# Patient Record
Sex: MALE | Race: WHITE | NOT HISPANIC OR LATINO | Employment: OTHER | URBAN - METROPOLITAN AREA
[De-identification: names, ages, dates, MRNs, and addresses within clinical notes are randomized per-mention and may not be internally consistent; named-entity substitution may affect disease eponyms.]

---

## 2018-06-24 ENCOUNTER — HOSPITAL ENCOUNTER (INPATIENT)
Facility: MEDICAL CENTER | Age: 83
LOS: 3 days | DRG: 728 | End: 2018-06-27
Attending: EMERGENCY MEDICINE | Admitting: HOSPITALIST
Payer: MEDICARE

## 2018-06-24 ENCOUNTER — APPOINTMENT (OUTPATIENT)
Dept: RADIOLOGY | Facility: MEDICAL CENTER | Age: 83
DRG: 728 | End: 2018-06-24
Attending: EMERGENCY MEDICINE
Payer: MEDICARE

## 2018-06-24 DIAGNOSIS — E11.42 DIABETIC POLYNEUROPATHY ASSOCIATED WITH TYPE 2 DIABETES MELLITUS (HCC): ICD-10-CM

## 2018-06-24 DIAGNOSIS — M54.50 ACUTE LOW BACK PAIN WITHOUT SCIATICA, UNSPECIFIED BACK PAIN LATERALITY: ICD-10-CM

## 2018-06-24 DIAGNOSIS — M53.3 BACK PAIN, SACROILIAC: ICD-10-CM

## 2018-06-24 DIAGNOSIS — E86.0 DEHYDRATION: ICD-10-CM

## 2018-06-24 DIAGNOSIS — L03.317 CELLULITIS OF BUTTOCK: ICD-10-CM

## 2018-06-24 DIAGNOSIS — E87.20 LACTIC ACIDOSIS: ICD-10-CM

## 2018-06-24 PROBLEM — N48.22 CELLULITIS, PENIS: Status: ACTIVE | Noted: 2018-06-24

## 2018-06-24 PROBLEM — Z79.4 TYPE 2 DIABETES MELLITUS WITH NEUROLOGIC COMPLICATION, WITH LONG-TERM CURRENT USE OF INSULIN (HCC): Status: ACTIVE | Noted: 2018-06-24

## 2018-06-24 PROBLEM — E11.49 TYPE 2 DIABETES MELLITUS WITH NEUROLOGIC COMPLICATION, WITH LONG-TERM CURRENT USE OF INSULIN (HCC): Status: ACTIVE | Noted: 2018-06-24

## 2018-06-24 PROBLEM — Z86.718 HISTORY OF DVT (DEEP VEIN THROMBOSIS): Status: ACTIVE | Noted: 2018-06-24

## 2018-06-24 PROBLEM — Z72.0 TOBACCO ABUSE: Status: ACTIVE | Noted: 2018-06-24

## 2018-06-24 LAB
ALBUMIN SERPL BCP-MCNC: 4 G/DL (ref 3.2–4.9)
ALBUMIN/GLOB SERPL: 1.1 G/DL
ALP SERPL-CCNC: 96 U/L (ref 30–99)
ALT SERPL-CCNC: 11 U/L (ref 2–50)
ANION GAP SERPL CALC-SCNC: 14 MMOL/L (ref 0–11.9)
AST SERPL-CCNC: 11 U/L (ref 12–45)
BASOPHILS # BLD AUTO: 0.4 % (ref 0–1.8)
BASOPHILS # BLD: 0.06 K/UL (ref 0–0.12)
BILIRUB SERPL-MCNC: 0.8 MG/DL (ref 0.1–1.5)
BNP SERPL-MCNC: 62 PG/ML (ref 0–100)
BUN SERPL-MCNC: 28 MG/DL (ref 8–22)
CALCIUM SERPL-MCNC: 9.7 MG/DL (ref 8.5–10.5)
CHLORIDE SERPL-SCNC: 94 MMOL/L (ref 96–112)
CK SERPL-CCNC: 63 U/L (ref 0–154)
CO2 SERPL-SCNC: 24 MMOL/L (ref 20–33)
CREAT SERPL-MCNC: 1.14 MG/DL (ref 0.5–1.4)
EOSINOPHIL # BLD AUTO: 0 K/UL (ref 0–0.51)
EOSINOPHIL NFR BLD: 0 % (ref 0–6.9)
ERYTHROCYTE [DISTWIDTH] IN BLOOD BY AUTOMATED COUNT: 42.7 FL (ref 35.9–50)
EST. AVERAGE GLUCOSE BLD GHB EST-MCNC: 355 MG/DL
GLOBULIN SER CALC-MCNC: 3.5 G/DL (ref 1.9–3.5)
GLUCOSE BLD-MCNC: 368 MG/DL (ref 65–99)
GLUCOSE BLD-MCNC: 489 MG/DL (ref 65–99)
GLUCOSE BLD-MCNC: 547 MG/DL (ref 65–99)
GLUCOSE BLD-MCNC: >600 MG/DL (ref 65–99)
GLUCOSE SERPL-MCNC: 735 MG/DL (ref 65–99)
HBA1C MFR BLD: 14 % (ref 0–5.6)
HCT VFR BLD AUTO: 51.8 % (ref 42–52)
HGB BLD-MCNC: 17.6 G/DL (ref 14–18)
IMM GRANULOCYTES # BLD AUTO: 0.07 K/UL (ref 0–0.11)
IMM GRANULOCYTES NFR BLD AUTO: 0.5 % (ref 0–0.9)
INR PPP: 2.84 (ref 0.87–1.13)
LACTATE BLD-SCNC: 2.3 MMOL/L (ref 0.5–2)
LYMPHOCYTES # BLD AUTO: 1.22 K/UL (ref 1–4.8)
LYMPHOCYTES NFR BLD: 8.6 % (ref 22–41)
MCH RBC QN AUTO: 31 PG (ref 27–33)
MCHC RBC AUTO-ENTMCNC: 34 G/DL (ref 33.7–35.3)
MCV RBC AUTO: 91.2 FL (ref 81.4–97.8)
MONOCYTES # BLD AUTO: 0.4 K/UL (ref 0–0.85)
MONOCYTES NFR BLD AUTO: 2.8 % (ref 0–13.4)
NEUTROPHILS # BLD AUTO: 12.51 K/UL (ref 1.82–7.42)
NEUTROPHILS NFR BLD: 87.7 % (ref 44–72)
NRBC # BLD AUTO: 0 K/UL
NRBC BLD-RTO: 0 /100 WBC
PLATELET # BLD AUTO: 200 K/UL (ref 164–446)
PMV BLD AUTO: 11.9 FL (ref 9–12.9)
POTASSIUM SERPL-SCNC: 4.3 MMOL/L (ref 3.6–5.5)
PROT SERPL-MCNC: 7.5 G/DL (ref 6–8.2)
PROTHROMBIN TIME: 29.5 SEC (ref 12–14.6)
RBC # BLD AUTO: 5.68 M/UL (ref 4.7–6.1)
SODIUM SERPL-SCNC: 132 MMOL/L (ref 135–145)
TROPONIN I SERPL-MCNC: 0.08 NG/ML (ref 0–0.04)
WBC # BLD AUTO: 14.3 K/UL (ref 4.8–10.8)

## 2018-06-24 PROCEDURE — 700105 HCHG RX REV CODE 258: Performed by: HOSPITALIST

## 2018-06-24 PROCEDURE — 71045 X-RAY EXAM CHEST 1 VIEW: CPT

## 2018-06-24 PROCEDURE — 700102 HCHG RX REV CODE 250 W/ 637 OVERRIDE(OP): Performed by: HOSPITALIST

## 2018-06-24 PROCEDURE — 99285 EMERGENCY DEPT VISIT HI MDM: CPT

## 2018-06-24 PROCEDURE — 83605 ASSAY OF LACTIC ACID: CPT

## 2018-06-24 PROCEDURE — 85610 PROTHROMBIN TIME: CPT

## 2018-06-24 PROCEDURE — 770006 HCHG ROOM/CARE - MED/SURG/GYN SEMI*

## 2018-06-24 PROCEDURE — 96375 TX/PRO/DX INJ NEW DRUG ADDON: CPT

## 2018-06-24 PROCEDURE — 302255 BARRIER CREAM MOISTURE BAZA PROTECT (ZINC) 5OZ: Performed by: HOSPITALIST

## 2018-06-24 PROCEDURE — 80053 COMPREHEN METABOLIC PANEL: CPT

## 2018-06-24 PROCEDURE — 96365 THER/PROPH/DIAG IV INF INIT: CPT

## 2018-06-24 PROCEDURE — 700111 HCHG RX REV CODE 636 W/ 250 OVERRIDE (IP): Performed by: HOSPITALIST

## 2018-06-24 PROCEDURE — 99221 1ST HOSP IP/OBS SF/LOW 40: CPT | Performed by: HOSPITALIST

## 2018-06-24 PROCEDURE — 304561 HCHG STAT O2

## 2018-06-24 PROCEDURE — 700111 HCHG RX REV CODE 636 W/ 250 OVERRIDE (IP): Performed by: EMERGENCY MEDICINE

## 2018-06-24 PROCEDURE — 36415 COLL VENOUS BLD VENIPUNCTURE: CPT

## 2018-06-24 PROCEDURE — 84484 ASSAY OF TROPONIN QUANT: CPT

## 2018-06-24 PROCEDURE — 700105 HCHG RX REV CODE 258: Performed by: EMERGENCY MEDICINE

## 2018-06-24 PROCEDURE — 85025 COMPLETE CBC W/AUTO DIFF WBC: CPT

## 2018-06-24 PROCEDURE — 82550 ASSAY OF CK (CPK): CPT

## 2018-06-24 PROCEDURE — 96367 TX/PROPH/DG ADDL SEQ IV INF: CPT

## 2018-06-24 PROCEDURE — 82962 GLUCOSE BLOOD TEST: CPT

## 2018-06-24 PROCEDURE — 96366 THER/PROPH/DIAG IV INF ADDON: CPT

## 2018-06-24 PROCEDURE — 87040 BLOOD CULTURE FOR BACTERIA: CPT | Mod: 91

## 2018-06-24 PROCEDURE — A9270 NON-COVERED ITEM OR SERVICE: HCPCS | Performed by: HOSPITALIST

## 2018-06-24 PROCEDURE — 93005 ELECTROCARDIOGRAM TRACING: CPT | Performed by: EMERGENCY MEDICINE

## 2018-06-24 PROCEDURE — 96372 THER/PROPH/DIAG INJ SC/IM: CPT

## 2018-06-24 PROCEDURE — 83880 ASSAY OF NATRIURETIC PEPTIDE: CPT

## 2018-06-24 PROCEDURE — 83036 HEMOGLOBIN GLYCOSYLATED A1C: CPT

## 2018-06-24 RX ORDER — GABAPENTIN 300 MG/1
600 CAPSULE ORAL 3 TIMES DAILY
Status: DISCONTINUED | OUTPATIENT
Start: 2018-06-24 | End: 2018-06-25

## 2018-06-24 RX ORDER — HYDROCODONE BITARTRATE AND ACETAMINOPHEN 7.5; 325 MG/1; MG/1
1 TABLET ORAL EVERY 8 HOURS PRN
COMMUNITY

## 2018-06-24 RX ORDER — METOLAZONE 2.5 MG/1
2.5 TABLET ORAL
COMMUNITY

## 2018-06-24 RX ORDER — POTASSIUM CHLORIDE 20 MEQ/1
20 TABLET, EXTENDED RELEASE ORAL DAILY
COMMUNITY

## 2018-06-24 RX ORDER — ATORVASTATIN CALCIUM 10 MG/1
10 TABLET, FILM COATED ORAL NIGHTLY
COMMUNITY

## 2018-06-24 RX ORDER — INSULIN GLARGINE 100 [IU]/ML
40 INJECTION, SOLUTION SUBCUTANEOUS NIGHTLY
Status: ON HOLD | COMMUNITY
End: 2018-06-24

## 2018-06-24 RX ORDER — DIAZEPAM 2 MG/1
2 TABLET ORAL 2 TIMES DAILY PRN
COMMUNITY

## 2018-06-24 RX ORDER — AMIODARONE HYDROCHLORIDE 200 MG/1
200 TABLET ORAL DAILY
Status: DISCONTINUED | OUTPATIENT
Start: 2018-06-25 | End: 2018-06-24

## 2018-06-24 RX ORDER — INSULIN GLARGINE 100 [IU]/ML
25 INJECTION, SOLUTION SUBCUTANEOUS EVERY EVENING
Status: DISCONTINUED | OUTPATIENT
Start: 2018-06-24 | End: 2018-06-25

## 2018-06-24 RX ORDER — POLYETHYLENE GLYCOL 3350 17 G/17G
1 POWDER, FOR SOLUTION ORAL
Status: DISCONTINUED | OUTPATIENT
Start: 2018-06-24 | End: 2018-06-27 | Stop reason: HOSPADM

## 2018-06-24 RX ORDER — DEXTROSE MONOHYDRATE 25 G/50ML
25 INJECTION, SOLUTION INTRAVENOUS
Status: DISCONTINUED | OUTPATIENT
Start: 2018-06-24 | End: 2018-06-27 | Stop reason: HOSPADM

## 2018-06-24 RX ORDER — WARFARIN SODIUM 7.5 MG/1
7.5-1 TABLET ORAL DAILY
COMMUNITY

## 2018-06-24 RX ORDER — SODIUM CHLORIDE 9 MG/ML
INJECTION, SOLUTION INTRAVENOUS CONTINUOUS
Status: DISCONTINUED | OUTPATIENT
Start: 2018-06-24 | End: 2018-06-25

## 2018-06-24 RX ORDER — ACETAMINOPHEN 325 MG/1
650 TABLET ORAL EVERY 6 HOURS PRN
Status: DISCONTINUED | OUTPATIENT
Start: 2018-06-24 | End: 2018-06-27 | Stop reason: HOSPADM

## 2018-06-24 RX ORDER — NICOTINE 21 MG/24HR
21 PATCH, TRANSDERMAL 24 HOURS TRANSDERMAL
Status: DISCONTINUED | OUTPATIENT
Start: 2018-06-24 | End: 2018-06-27 | Stop reason: HOSPADM

## 2018-06-24 RX ORDER — WARFARIN SODIUM 10 MG/1
10 TABLET ORAL
Status: COMPLETED | OUTPATIENT
Start: 2018-06-24 | End: 2018-06-24

## 2018-06-24 RX ORDER — LISINOPRIL 5 MG/1
5 TABLET ORAL DAILY
COMMUNITY

## 2018-06-24 RX ORDER — BISACODYL 10 MG
10 SUPPOSITORY, RECTAL RECTAL
Status: DISCONTINUED | OUTPATIENT
Start: 2018-06-24 | End: 2018-06-27 | Stop reason: HOSPADM

## 2018-06-24 RX ORDER — WARFARIN SODIUM 5 MG/1
5 TABLET ORAL DAILY
Status: ON HOLD | COMMUNITY
End: 2018-06-24

## 2018-06-24 RX ORDER — AMOXICILLIN 250 MG
2 CAPSULE ORAL 2 TIMES DAILY
Status: DISCONTINUED | OUTPATIENT
Start: 2018-06-24 | End: 2018-06-27 | Stop reason: HOSPADM

## 2018-06-24 RX ORDER — AMIODARONE HYDROCHLORIDE 200 MG/1
200 TABLET ORAL DAILY
Status: DISCONTINUED | OUTPATIENT
Start: 2018-06-24 | End: 2018-06-25

## 2018-06-24 RX ORDER — LORAZEPAM 2 MG/ML
0.5 INJECTION INTRAMUSCULAR ONCE
Status: COMPLETED | OUTPATIENT
Start: 2018-06-24 | End: 2018-06-24

## 2018-06-24 RX ORDER — LATANOPROST 50 UG/ML
1 SOLUTION/ DROPS OPHTHALMIC NIGHTLY
COMMUNITY

## 2018-06-24 RX ORDER — SODIUM CHLORIDE 9 MG/ML
1000 INJECTION, SOLUTION INTRAVENOUS ONCE
Status: COMPLETED | OUTPATIENT
Start: 2018-06-24 | End: 2018-06-24

## 2018-06-24 RX ORDER — POTASSIUM CHLORIDE 1.5 G/1.58G
20 POWDER, FOR SOLUTION ORAL DAILY
Status: ON HOLD | COMMUNITY
End: 2018-06-24

## 2018-06-24 RX ORDER — TRAZODONE HYDROCHLORIDE 50 MG/1
50 TABLET ORAL
COMMUNITY

## 2018-06-24 RX ADMIN — LORAZEPAM 0.5 MG: 2 INJECTION INTRAMUSCULAR; INTRAVENOUS at 10:33

## 2018-06-24 RX ADMIN — SODIUM CHLORIDE: 9 INJECTION, SOLUTION INTRAVENOUS at 22:04

## 2018-06-24 RX ADMIN — INSULIN HUMAN 14 UNITS: 100 INJECTION, SOLUTION PARENTERAL at 17:05

## 2018-06-24 RX ADMIN — AMIODARONE HYDROCHLORIDE 200 MG: 200 TABLET ORAL at 22:09

## 2018-06-24 RX ADMIN — AMPICILLIN SODIUM AND SULBACTAM SODIUM 3 G: 2; 1 INJECTION, POWDER, FOR SOLUTION INTRAMUSCULAR; INTRAVENOUS at 17:01

## 2018-06-24 RX ADMIN — SERTRALINE 50 MG: 50 TABLET, FILM COATED ORAL at 22:10

## 2018-06-24 RX ADMIN — METOPROLOL TARTRATE 25 MG: 25 TABLET, FILM COATED ORAL at 22:10

## 2018-06-24 RX ADMIN — DOCUSATE SODIUM -SENNOSIDES 2 TABLET: 50; 8.6 TABLET, COATED ORAL at 22:10

## 2018-06-24 RX ADMIN — INSULIN GLARGINE 25 UNITS: 100 INJECTION, SOLUTION SUBCUTANEOUS at 22:10

## 2018-06-24 RX ADMIN — GABAPENTIN 600 MG: 300 CAPSULE ORAL at 22:10

## 2018-06-24 RX ADMIN — SODIUM CHLORIDE: 9 INJECTION, SOLUTION INTRAVENOUS at 13:30

## 2018-06-24 RX ADMIN — VANCOMYCIN HYDROCHLORIDE 2300 MG: 100 INJECTION, POWDER, LYOPHILIZED, FOR SOLUTION INTRAVENOUS at 11:20

## 2018-06-24 RX ADMIN — AMPICILLIN SODIUM AND SULBACTAM SODIUM 3 G: 2; 1 INJECTION, POWDER, FOR SOLUTION INTRAMUSCULAR; INTRAVENOUS at 09:47

## 2018-06-24 RX ADMIN — INSULIN HUMAN 12 UNITS: 100 INJECTION, SOLUTION PARENTERAL at 22:10

## 2018-06-24 RX ADMIN — WARFARIN SODIUM 10 MG: 10 TABLET ORAL at 22:10

## 2018-06-24 RX ADMIN — SODIUM CHLORIDE 1000 ML: 9 INJECTION, SOLUTION INTRAVENOUS at 09:01

## 2018-06-24 RX ADMIN — INSULIN GLARGINE 25 UNITS: 100 INJECTION, SOLUTION SUBCUTANEOUS at 12:46

## 2018-06-24 ASSESSMENT — ENCOUNTER SYMPTOMS
FEVER: 0
SHORTNESS OF BREATH: 0
CHILLS: 0
HEADACHES: 0
SHORTNESS OF BREATH: 1
WEAKNESS: 1
WEIGHT LOSS: 1
VOMITING: 0
ABDOMINAL PAIN: 0
NAUSEA: 0

## 2018-06-24 ASSESSMENT — LIFESTYLE VARIABLES
ON A TYPICAL DAY WHEN YOU DRINK ALCOHOL HOW MANY DRINKS DO YOU HAVE: 1
EVER FELT BAD OR GUILTY ABOUT YOUR DRINKING: NO
CONSUMPTION TOTAL: NEGATIVE
TOTAL SCORE: 0
ALCOHOL_USE: YES
HOW MANY TIMES IN THE PAST YEAR HAVE YOU HAD 5 OR MORE DRINKS IN A DAY: 0
AVERAGE NUMBER OF DAYS PER WEEK YOU HAVE A DRINK CONTAINING ALCOHOL: 1
EVER_SMOKED: YES
HAVE YOU EVER FELT YOU SHOULD CUT DOWN ON YOUR DRINKING: NO
EVER HAD A DRINK FIRST THING IN THE MORNING TO STEADY YOUR NERVES TO GET RID OF A HANGOVER: NO
TOTAL SCORE: 0
TOTAL SCORE: 0
HAVE PEOPLE ANNOYED YOU BY CRITICIZING YOUR DRINKING: NO

## 2018-06-24 ASSESSMENT — PAIN SCALES - GENERAL
PAINLEVEL_OUTOF10: 0
PAINLEVEL_OUTOF10: 2
PAINLEVEL_OUTOF10: 5
PAINLEVEL_OUTOF10: 4

## 2018-06-24 ASSESSMENT — PATIENT HEALTH QUESTIONNAIRE - PHQ9
2. FEELING DOWN, DEPRESSED, IRRITABLE, OR HOPELESS: NOT AT ALL
SUM OF ALL RESPONSES TO PHQ9 QUESTIONS 1 AND 2: 0
1. LITTLE INTEREST OR PLEASURE IN DOING THINGS: NOT AT ALL

## 2018-06-24 NOTE — ED NOTES
Unable to complete Med rec  Pt wife @[phone] said she would bring in all medications at 1500 on 6/24.

## 2018-06-24 NOTE — ED NOTES
Pt report called in to [facility], questions and comments addressed. Pt updated on poc and admit plans. PIV remains CDI and patent. Pt transported by transport to [id number]/01 now.

## 2018-06-24 NOTE — ED NOTES
Karen from Lab called with critical result of Glucose 735 at 1057. Critical lab result read back to Karen.   Dr. Fuchs notified of critical lab result at 1102.  Critical lab result read back by Dr. Fuchs.
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
## 2018-06-24 NOTE — ED NOTES
[place] [facility] fall assessment complete, pt is HIGH risk for fall. Interventions complete. Pt placed in yellow non-slip socks, wrist band placed, green sign on door. Bed locked in low position, call light in place. Personal possessions in place.  Personal needs assessed. Charge nurse notified to move pt to a more visible room if available. Safety assessed. Will monitor frequently.

## 2018-06-24 NOTE — ED NOTES
"Pt repeatedly requesting staff and yelling. Pt refusing to urinate in urinal provided at bedside, stating \"I just want to piss all over myself.\" Pt refusing assistance from staff to help with urinal. Pt yelling at staff with various requests. Pt requesting for staff \"to [facility] and tell them I will be suing.  Dr Fuchs made aware of pt's behaviors, orders placed.  "

## 2018-06-25 ENCOUNTER — APPOINTMENT (OUTPATIENT)
Dept: RADIOLOGY | Facility: MEDICAL CENTER | Age: 83
DRG: 728 | End: 2018-06-25
Attending: INTERNAL MEDICINE
Payer: MEDICARE

## 2018-06-25 PROBLEM — I48.0 PAF (PAROXYSMAL ATRIAL FIBRILLATION) (HCC): Status: ACTIVE | Noted: 2018-06-24

## 2018-06-25 PROBLEM — F11.20 OPIOID DEPENDENCE (HCC): Status: ACTIVE | Noted: 2018-06-25

## 2018-06-25 LAB
ANION GAP SERPL CALC-SCNC: 7 MMOL/L (ref 0–11.9)
APPEARANCE UR: ABNORMAL
BACTERIA #/AREA URNS HPF: NEGATIVE /HPF
BASOPHILS # BLD AUTO: 0.6 % (ref 0–1.8)
BASOPHILS # BLD: 0.06 K/UL (ref 0–0.12)
BILIRUB UR QL STRIP.AUTO: NEGATIVE
BUN SERPL-MCNC: 23 MG/DL (ref 8–22)
CALCIUM SERPL-MCNC: 8.4 MG/DL (ref 8.5–10.5)
CHLORIDE SERPL-SCNC: 105 MMOL/L (ref 96–112)
CO2 SERPL-SCNC: 28 MMOL/L (ref 20–33)
COLOR UR: YELLOW
CREAT SERPL-MCNC: 0.9 MG/DL (ref 0.5–1.4)
EKG IMPRESSION: NORMAL
EOSINOPHIL # BLD AUTO: 0.11 K/UL (ref 0–0.51)
EOSINOPHIL NFR BLD: 1 % (ref 0–6.9)
EPI CELLS #/AREA URNS HPF: ABNORMAL /HPF
ERYTHROCYTE [DISTWIDTH] IN BLOOD BY AUTOMATED COUNT: 41.9 FL (ref 35.9–50)
GLUCOSE BLD-MCNC: 221 MG/DL (ref 65–99)
GLUCOSE BLD-MCNC: 228 MG/DL (ref 65–99)
GLUCOSE BLD-MCNC: 269 MG/DL (ref 65–99)
GLUCOSE BLD-MCNC: 305 MG/DL (ref 65–99)
GLUCOSE SERPL-MCNC: 187 MG/DL (ref 65–99)
GLUCOSE UR STRIP.AUTO-MCNC: >=1000 MG/DL
HCT VFR BLD AUTO: 47.7 % (ref 42–52)
HGB BLD-MCNC: 16.2 G/DL (ref 14–18)
HYALINE CASTS #/AREA URNS LPF: ABNORMAL /LPF
IMM GRANULOCYTES # BLD AUTO: 0.04 K/UL (ref 0–0.11)
IMM GRANULOCYTES NFR BLD AUTO: 0.4 % (ref 0–0.9)
INR PPP: 2.64 (ref 0.87–1.13)
KETONES UR STRIP.AUTO-MCNC: ABNORMAL MG/DL
LEUKOCYTE ESTERASE UR QL STRIP.AUTO: ABNORMAL
LYMPHOCYTES # BLD AUTO: 2.82 K/UL (ref 1–4.8)
LYMPHOCYTES NFR BLD: 26.5 % (ref 22–41)
MCH RBC QN AUTO: 30.7 PG (ref 27–33)
MCHC RBC AUTO-ENTMCNC: 34 G/DL (ref 33.7–35.3)
MCV RBC AUTO: 90.3 FL (ref 81.4–97.8)
MICRO URNS: ABNORMAL
MONOCYTES # BLD AUTO: 0.86 K/UL (ref 0–0.85)
MONOCYTES NFR BLD AUTO: 8.1 % (ref 0–13.4)
NEUTROPHILS # BLD AUTO: 6.74 K/UL (ref 1.82–7.42)
NEUTROPHILS NFR BLD: 63.4 % (ref 44–72)
NITRITE UR QL STRIP.AUTO: NEGATIVE
NRBC # BLD AUTO: 0 K/UL
NRBC BLD-RTO: 0 /100 WBC
PH UR STRIP.AUTO: 5 [PH]
PLATELET # BLD AUTO: 206 K/UL (ref 164–446)
PMV BLD AUTO: 11.4 FL (ref 9–12.9)
POTASSIUM SERPL-SCNC: 3.4 MMOL/L (ref 3.6–5.5)
PROT UR QL STRIP: NEGATIVE MG/DL
PROTHROMBIN TIME: 27.9 SEC (ref 12–14.6)
RBC # BLD AUTO: 5.28 M/UL (ref 4.7–6.1)
RBC # URNS HPF: ABNORMAL /HPF
RBC UR QL AUTO: NEGATIVE
SODIUM SERPL-SCNC: 140 MMOL/L (ref 135–145)
SP GR UR STRIP.AUTO: 1.03
UROBILINOGEN UR STRIP.AUTO-MCNC: 1 MG/DL
WBC # BLD AUTO: 10.6 K/UL (ref 4.8–10.8)
WBC #/AREA URNS HPF: ABNORMAL /HPF
YEAST BUDDING URNS QL: PRESENT /HPF

## 2018-06-25 PROCEDURE — 700102 HCHG RX REV CODE 250 W/ 637 OVERRIDE(OP): Performed by: INTERNAL MEDICINE

## 2018-06-25 PROCEDURE — 81001 URINALYSIS AUTO W/SCOPE: CPT

## 2018-06-25 PROCEDURE — A9270 NON-COVERED ITEM OR SERVICE: HCPCS | Performed by: INTERNAL MEDICINE

## 2018-06-25 PROCEDURE — 85025 COMPLETE CBC W/AUTO DIFF WBC: CPT

## 2018-06-25 PROCEDURE — 80048 BASIC METABOLIC PNL TOTAL CA: CPT

## 2018-06-25 PROCEDURE — 72100 X-RAY EXAM L-S SPINE 2/3 VWS: CPT

## 2018-06-25 PROCEDURE — 36415 COLL VENOUS BLD VENIPUNCTURE: CPT

## 2018-06-25 PROCEDURE — A9270 NON-COVERED ITEM OR SERVICE: HCPCS | Performed by: HOSPITALIST

## 2018-06-25 PROCEDURE — 700111 HCHG RX REV CODE 636 W/ 250 OVERRIDE (IP): Performed by: HOSPITALIST

## 2018-06-25 PROCEDURE — 99232 SBSQ HOSP IP/OBS MODERATE 35: CPT | Performed by: INTERNAL MEDICINE

## 2018-06-25 PROCEDURE — 74176 CT ABD & PELVIS W/O CONTRAST: CPT

## 2018-06-25 PROCEDURE — 85610 PROTHROMBIN TIME: CPT

## 2018-06-25 PROCEDURE — 700102 HCHG RX REV CODE 250 W/ 637 OVERRIDE(OP): Performed by: HOSPITALIST

## 2018-06-25 PROCEDURE — 82962 GLUCOSE BLOOD TEST: CPT

## 2018-06-25 PROCEDURE — 72040 X-RAY EXAM NECK SPINE 2-3 VW: CPT

## 2018-06-25 PROCEDURE — 770006 HCHG ROOM/CARE - MED/SURG/GYN SEMI*

## 2018-06-25 PROCEDURE — 700105 HCHG RX REV CODE 258: Performed by: HOSPITALIST

## 2018-06-25 RX ORDER — LATANOPROST 50 UG/ML
1 SOLUTION/ DROPS OPHTHALMIC NIGHTLY
Status: DISCONTINUED | OUTPATIENT
Start: 2018-06-25 | End: 2018-06-27 | Stop reason: HOSPADM

## 2018-06-25 RX ORDER — ATORVASTATIN CALCIUM 10 MG/1
10 TABLET, FILM COATED ORAL NIGHTLY
Status: DISCONTINUED | OUTPATIENT
Start: 2018-06-25 | End: 2018-06-27 | Stop reason: HOSPADM

## 2018-06-25 RX ORDER — FLUCONAZOLE 200 MG/1
100 TABLET ORAL DAILY
Status: DISCONTINUED | OUTPATIENT
Start: 2018-06-25 | End: 2018-06-26

## 2018-06-25 RX ORDER — WARFARIN SODIUM 10 MG/1
10 TABLET ORAL
Status: DISCONTINUED | OUTPATIENT
Start: 2018-06-26 | End: 2018-06-27 | Stop reason: HOSPADM

## 2018-06-25 RX ORDER — DIAZEPAM 2 MG/1
2 TABLET ORAL 2 TIMES DAILY PRN
Status: DISCONTINUED | OUTPATIENT
Start: 2018-06-25 | End: 2018-06-27 | Stop reason: HOSPADM

## 2018-06-25 RX ORDER — TRAZODONE HYDROCHLORIDE 50 MG/1
50 TABLET ORAL
Status: DISCONTINUED | OUTPATIENT
Start: 2018-06-25 | End: 2018-06-27 | Stop reason: HOSPADM

## 2018-06-25 RX ORDER — METOLAZONE 2.5 MG/1
2.5 TABLET ORAL
Status: DISCONTINUED | OUTPATIENT
Start: 2018-06-25 | End: 2018-06-27 | Stop reason: HOSPADM

## 2018-06-25 RX ORDER — POTASSIUM CHLORIDE 20 MEQ/1
20 TABLET, EXTENDED RELEASE ORAL DAILY
Status: DISCONTINUED | OUTPATIENT
Start: 2018-06-25 | End: 2018-06-27 | Stop reason: HOSPADM

## 2018-06-25 RX ORDER — LISINOPRIL 5 MG/1
5 TABLET ORAL DAILY
Status: DISCONTINUED | OUTPATIENT
Start: 2018-06-26 | End: 2018-06-27 | Stop reason: HOSPADM

## 2018-06-25 RX ORDER — WARFARIN SODIUM 7.5 MG/1
7.5 TABLET ORAL
Status: DISCONTINUED | OUTPATIENT
Start: 2018-06-25 | End: 2018-06-27 | Stop reason: HOSPADM

## 2018-06-25 RX ORDER — HYDROCODONE BITARTRATE AND ACETAMINOPHEN 7.5; 325 MG/1; MG/1
1 TABLET ORAL EVERY 8 HOURS PRN
Status: DISCONTINUED | OUTPATIENT
Start: 2018-06-25 | End: 2018-06-25

## 2018-06-25 RX ORDER — INSULIN GLARGINE 100 [IU]/ML
33 INJECTION, SOLUTION SUBCUTANEOUS EVERY EVENING
Status: DISCONTINUED | OUTPATIENT
Start: 2018-06-25 | End: 2018-06-27 | Stop reason: HOSPADM

## 2018-06-25 RX ORDER — HYDROCODONE BITARTRATE AND ACETAMINOPHEN 7.5; 325 MG/1; MG/1
1 TABLET ORAL EVERY 6 HOURS PRN
Status: DISCONTINUED | OUTPATIENT
Start: 2018-06-25 | End: 2018-06-27 | Stop reason: HOSPADM

## 2018-06-25 RX ADMIN — HYDROCODONE BITARTRATE AND ACETAMINOPHEN 1 TABLET: 7.5; 325 TABLET ORAL at 16:31

## 2018-06-25 RX ADMIN — AMPICILLIN SODIUM AND SULBACTAM SODIUM 3 G: 2; 1 INJECTION, POWDER, FOR SOLUTION INTRAMUSCULAR; INTRAVENOUS at 00:00

## 2018-06-25 RX ADMIN — INSULIN HUMAN 7 UNITS: 100 INJECTION, SOLUTION PARENTERAL at 21:51

## 2018-06-25 RX ADMIN — SODIUM CHLORIDE: 9 INJECTION, SOLUTION INTRAVENOUS at 06:17

## 2018-06-25 RX ADMIN — AMIODARONE HYDROCHLORIDE 200 MG: 200 TABLET ORAL at 07:59

## 2018-06-25 RX ADMIN — INSULIN HUMAN 4 UNITS: 100 INJECTION, SOLUTION PARENTERAL at 18:01

## 2018-06-25 RX ADMIN — INSULIN GLARGINE 33 UNITS: 100 INJECTION, SOLUTION SUBCUTANEOUS at 21:50

## 2018-06-25 RX ADMIN — METOPROLOL TARTRATE 25 MG: 25 TABLET, FILM COATED ORAL at 07:59

## 2018-06-25 RX ADMIN — GABAPENTIN 600 MG: 300 CAPSULE ORAL at 07:59

## 2018-06-25 RX ADMIN — ATORVASTATIN CALCIUM 10 MG: 10 TABLET, FILM COATED ORAL at 21:45

## 2018-06-25 RX ADMIN — SODIUM CHLORIDE: 9 INJECTION, SOLUTION INTRAVENOUS at 11:54

## 2018-06-25 RX ADMIN — HYDROCODONE BITARTRATE AND ACETAMINOPHEN 1 TABLET: 7.5; 325 TABLET ORAL at 22:53

## 2018-06-25 RX ADMIN — WARFARIN SODIUM 7.5 MG: 7.5 TABLET ORAL at 16:33

## 2018-06-25 RX ADMIN — INSULIN HUMAN 4 UNITS: 100 INJECTION, SOLUTION PARENTERAL at 06:13

## 2018-06-25 RX ADMIN — LATANOPROST 1 DROP: 50 SOLUTION OPHTHALMIC at 22:01

## 2018-06-25 RX ADMIN — POTASSIUM CHLORIDE 20 MEQ: 1500 TABLET, EXTENDED RELEASE ORAL at 16:31

## 2018-06-25 RX ADMIN — METFORMIN HYDROCHLORIDE 1000 MG: 500 TABLET, FILM COATED ORAL at 16:31

## 2018-06-25 RX ADMIN — METOPROLOL TARTRATE 25 MG: 25 TABLET, FILM COATED ORAL at 21:46

## 2018-06-25 RX ADMIN — SERTRALINE 50 MG: 50 TABLET, FILM COATED ORAL at 07:59

## 2018-06-25 RX ADMIN — AMPICILLIN SODIUM AND SULBACTAM SODIUM 3 G: 2; 1 INJECTION, POWDER, FOR SOLUTION INTRAMUSCULAR; INTRAVENOUS at 16:31

## 2018-06-25 RX ADMIN — INSULIN HUMAN 10 UNITS: 100 INJECTION, SOLUTION PARENTERAL at 11:58

## 2018-06-25 RX ADMIN — FLUCONAZOLE 100 MG: 200 TABLET ORAL at 16:31

## 2018-06-25 RX ADMIN — AMPICILLIN SODIUM AND SULBACTAM SODIUM 3 G: 2; 1 INJECTION, POWDER, FOR SOLUTION INTRAMUSCULAR; INTRAVENOUS at 11:54

## 2018-06-25 RX ADMIN — GABAPENTIN 600 MG: 300 CAPSULE ORAL at 13:54

## 2018-06-25 RX ADMIN — AMPICILLIN SODIUM AND SULBACTAM SODIUM 3 G: 2; 1 INJECTION, POWDER, FOR SOLUTION INTRAMUSCULAR; INTRAVENOUS at 06:13

## 2018-06-25 ASSESSMENT — ENCOUNTER SYMPTOMS
NERVOUS/ANXIOUS: 1
NAUSEA: 0
VOMITING: 0
FEVER: 0
WHEEZING: 0
SORE THROAT: 0
BACK PAIN: 1
WEIGHT LOSS: 1
ROS GI COMMENTS: NO APPETITE
NECK PAIN: 1
FALLS: 1
SHORTNESS OF BREATH: 1
ABDOMINAL PAIN: 0
FOCAL WEAKNESS: 0
DIARRHEA: 0
EYE PAIN: 0
EYE DISCHARGE: 0
CONSTIPATION: 0
BLOOD IN STOOL: 0
WEAKNESS: 1
COUGH: 0
CHILLS: 0
DIZZINESS: 0

## 2018-06-25 ASSESSMENT — CHA2DS2 SCORE
CHA2DS2 VASC SCORE: 6
AGE 75 OR GREATER: YES
CHF OR LEFT VENTRICULAR DYSFUNCTION: NO
HYPERTENSION: YES
DIABETES: YES
PRIOR STROKE OR TIA OR THROMBOEMBOLISM: YES
VASCULAR DISEASE: NO
AGE 65 TO 74: NO
SEX: MALE

## 2018-06-25 ASSESSMENT — PAIN SCALES - GENERAL
PAINLEVEL_OUTOF10: 2
PAINLEVEL_OUTOF10: 9
PAINLEVEL_OUTOF10: 6
PAINLEVEL_OUTOF10: 3
PAINLEVEL_OUTOF10: 8
PAINLEVEL_OUTOF10: 3

## 2018-06-25 NOTE — PROGRESS NOTES
Inpatient Anticoagulation Service Note    Date: 6/24/2018  Reason for Anticoagulation: Atrial Fibrillation, Deep Vein Thrombosis        Hemoglobin Value: 17.6  Hematocrit Value: 51.8  Lab Platelet Value: 200  Target INR: 2.0 to 3.0    INR from last 7 days     Date/Time INR Value    06/24/18 1920 (!)  2.84        Dose from last 7 days     Date/Time Dose (mg)    06/24/18 1920  10        Significant Interactions: Amiodarone, Antibiotics  Bridge Therapy: No     Comments: patient is a poor historian. per wife and [facility] patient takes warfarin 7.5 mg on MWF and 10 mg on all other days for hx of DVT.  [facility] was 1.9 on 6/6/18.  pt is non-compliant with INR testing per [facility].  Patient was previously on Xarelto but apparently was switched to Warfarin in January of this year.  INR therapeutic. Will give presumed home dose tonight, trend INR for the next few days.    Plan:  10 mg tonight, INR tomorrow  Education Material Provided?: No (chronic warfarin ptaient )  Pharmacist suggested discharge dosing: TBLINDSEY Cui, PHARMD

## 2018-06-25 NOTE — DIETARY
"Nutrition services: Day 1 of admit.  Celso Fabian is a 83 y.o. male with admitting DX of cellulitis.  Pt noted with wt loss on nutrition admit screen.  Pt appears well nourished.  RD attempted to visit pt at bedside regarding PO intake and wt history however pt did not wish to be disturbed and said, \"go away.\"  RD to follow-up with pt at another appropriate time.    Assessment:  Height: 182.9 cm (6')  Weight: 93.9 kg (207 lb)  Body mass index is 28.07 kg/m². - overweight.  Diet/Intake: Diabetic.      Evaluation:   1. Pt noted with type 2 DM, diabetic polyneuropathy, and tobacco abuse.  Pt also has a history of stoke and HTN.  2. Labs: A1c: 14.0.  Meds reviewed.  3. Last BM: 6/25.    Recommendations/Plan:  1. Encourage intake of meals.  2. Document intake of all meals as % taken in ADL's to provide interdisciplinary communication across all shifts.   3. Monitor weight.  4. Nutrition rep will continue to see patient for ongoing meal and snack preferences.     RD following           "

## 2018-06-25 NOTE — CARE PLAN
Problem: Safety  Goal: Will remain free from injury  Outcome: PROGRESSING AS EXPECTED    Goal: Will remain free from falls  Outcome: PROGRESSING AS EXPECTED      Problem: Knowledge Deficit  Goal: Knowledge of disease process/condition, treatment plan, diagnostic tests, and medications will improve  Outcome: PROGRESSING AS EXPECTED    Goal: Knowledge of the prescribed therapeutic regimen will improve  Outcome: PROGRESSING AS EXPECTED      Problem: Skin Integrity  Goal: Risk for impaired skin integrity will decrease  Outcome: PROGRESSING AS EXPECTED

## 2018-06-25 NOTE — PROGRESS NOTES
"Patient does have a tendency to cough while drinking water. Per patient \"If you take my water, I'll die. I know my body; I'm a pro cougher!\". MD made aware and states patient can have water and food despite coughing with drinking. MD also aware that home medications have been updated and he will go through and order what he feels is necessary.   "

## 2018-06-25 NOTE — ASSESSMENT & PLAN NOTE
This is associated white blood cell count 14.3  WBCs improved on empiric antibiotic, there is no visible redness suspect he has balanitis  Diflucan initially added, now DC'd  Changed to oral Augmentin

## 2018-06-25 NOTE — ASSESSMENT & PLAN NOTE
Very poorly controlled.  Glucose on admission in the emergency room 735  Patient has various excuses, he states his last A1c was 7.  He does not check sugars at home.  Sugars are improved here but still high, will increase Lantus  Diabetes education to see if patient can use insulin pens or if he needs syringes and whether he continues 7030 or Lantus

## 2018-06-25 NOTE — ASSESSMENT & PLAN NOTE
He is on chronic Norco but no longer takes gabapentin  Is very fixated on receiving his pain medications and they have been ordered

## 2018-06-25 NOTE — PROGRESS NOTES
Inpatient Anticoagulation Service Note    Date: 2018  Reason for Anticoagulation: Atrial Fibrillation, Deep Vein Thrombosis   [id number] VASc Score: 6    Hemoglobin Value: 16.2  Hematocrit Value: 47.7  Lab Platelet Value: 206  Target INR: 2.0 to 3.0    INR from last 7 days     Date/Time INR Value    18 0219 (!)  2.64    18 1920 (!)  2.84        Dose from last 7 days     Date/Time Dose (mg)    18 1400  7.5    18 1920  10        Average Dose (mg):  (Home med: 7.5 mg on MWF, and 10 mg all other days)  Significant Interactions: Antibiotics, Azoles,  (Unasyn (--), fluconazole (--), Statin sertraline     Comments: INR remains therapeutic today. H/H stable. No reported s/sx of bleeding. Patient was started on Unasyn yesterday and fluconazole today for cellulitis. which may cause his INR to increase. Will plan to continue on home regimen. Repeat INR in AM due to new drug-drug interaction. Pharmacist will continue to monitor daily and adjust dose accordingly.     Plan:  7.5 mg PO x1, repeat INR in AM  Education Material Provided?: No (chronic warfarin pt)  Pharmacist suggested discharge dosin.5 mg Mon, Wed, Fri, and 10 mg all other days, repeat INR in 2-3 days after discharge.     Tonie Lopez, Pharm.D

## 2018-06-25 NOTE — CARE PLAN
Problem: Infection  Goal: Will remain free from infection  IV antibiotics ordered and administered.     Problem: Skin Integrity  Goal: Risk for impaired skin integrity will decrease  Coccyx/sacrum and penis are red. Wound consult in place, mepilex applied.
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
## 2018-06-25 NOTE — PROGRESS NOTES
Assumed care of patient. Patient requesting to sit up in chair. CNA and RN assisted patient. Penis is still red. Mepilex in place over coccyx redness. Bilateral lower extremities are karishma. Left second toe with dried blood and tear. Patient more pleasant today. No other needs at this time. Call light within reach.

## 2018-06-25 NOTE — CARE PLAN
Problem: Infection  Goal: Will remain free from infection  IV antibiotics ordered and administered.     Problem: Bowel/Gastric:  Goal: Normal bowel function is maintained or improved  Liquid loose bowel movements this morning. Stool softeners held this morning.

## 2018-06-25 NOTE — PROGRESS NOTES
[facility]. Attempted multiple times to obtain information from pt's wife, however, no information was provided. Med rec tech phone number was given to wife to communicate her arrival to the hospital this way med tech could reconcile home medications. This med tech did not receive a call.

## 2018-06-25 NOTE — PROGRESS NOTES
"Patient refusing to be hooked to  because it keeps alarming. When patient was re-educated that the  monitors his oxygen saturation and he should keep his nasal cannula on he stated, \"I know my body and I know when to breath! I don't want the oxygen on and I don't want that machine!\".     Patient also refusing lab to draw scheduled lab work.   "

## 2018-06-25 NOTE — PROGRESS NOTES
Patient arrived to unit via gurney from ER. Patient was a max assist with a slide board to the bed. Coccyx and sacrum is red and non blanching in areas. Also, tear to left second toe with dried, old blood. Pictures taken. Wound consult in place. Mepilex placed. Penis is quite red as well. Patient alert and oriented but goes off on inappropriate tangents. Patient angers easily. Patient oriented to room, unit and call light. Admit profile completed. Patient states wife is bringing home medications in later to verify. No other needs at this time. Call light within reach.

## 2018-06-25 NOTE — PROGRESS NOTES
Assume pt care. Pt a/o x3, VSS, No acute issues overnight. Pt rounds Q1-2hr with assessment of 4p's. T/P Q2h risk of skin breakdown. Incontinent care provided with Q2h T/P or PRN. IV assessment and care given. Pt education provided base on admission, care plan, current medications, and PRN. Pt is DM FS ac/hs uncontrolled DM.     A hemoglobin A1c here is 14 consistent     6/24 @ 1920  PT  29.5  INR  2.84 --> 2.64    221 am FS

## 2018-06-26 PROBLEM — R91.8 MASS OF UPPER LOBE OF RIGHT LUNG: Status: ACTIVE | Noted: 2018-06-26

## 2018-06-26 LAB
25(OH)D3 SERPL-MCNC: 18 NG/ML (ref 30–100)
ALBUMIN SERPL BCP-MCNC: 3.4 G/DL (ref 3.2–4.9)
ALBUMIN/GLOB SERPL: 1.2 G/DL
ALP SERPL-CCNC: 74 U/L (ref 30–99)
ALT SERPL-CCNC: 18 U/L (ref 2–50)
ANION GAP SERPL CALC-SCNC: 9 MMOL/L (ref 0–11.9)
AST SERPL-CCNC: 23 U/L (ref 12–45)
BASOPHILS # BLD AUTO: 0.4 % (ref 0–1.8)
BASOPHILS # BLD: 0.05 K/UL (ref 0–0.12)
BILIRUB SERPL-MCNC: 0.6 MG/DL (ref 0.1–1.5)
BUN SERPL-MCNC: 21 MG/DL (ref 8–22)
CALCIUM SERPL-MCNC: 8.6 MG/DL (ref 8.5–10.5)
CHLORIDE SERPL-SCNC: 106 MMOL/L (ref 96–112)
CO2 SERPL-SCNC: 23 MMOL/L (ref 20–33)
CREAT SERPL-MCNC: 0.76 MG/DL (ref 0.5–1.4)
EOSINOPHIL # BLD AUTO: 0.13 K/UL (ref 0–0.51)
EOSINOPHIL NFR BLD: 1.1 % (ref 0–6.9)
ERYTHROCYTE [DISTWIDTH] IN BLOOD BY AUTOMATED COUNT: 43.2 FL (ref 35.9–50)
FOLATE SERPL-MCNC: >24 NG/ML
GLOBULIN SER CALC-MCNC: 2.9 G/DL (ref 1.9–3.5)
GLUCOSE BLD-MCNC: 129 MG/DL (ref 65–99)
GLUCOSE BLD-MCNC: 198 MG/DL (ref 65–99)
GLUCOSE BLD-MCNC: 294 MG/DL (ref 65–99)
GLUCOSE BLD-MCNC: 294 MG/DL (ref 65–99)
GLUCOSE SERPL-MCNC: 182 MG/DL (ref 65–99)
HCT VFR BLD AUTO: 54 % (ref 42–52)
HGB BLD-MCNC: 18 G/DL (ref 14–18)
IMM GRANULOCYTES # BLD AUTO: 0.03 K/UL (ref 0–0.11)
IMM GRANULOCYTES NFR BLD AUTO: 0.3 % (ref 0–0.9)
INR PPP: 2.85 (ref 0.87–1.13)
LYMPHOCYTES # BLD AUTO: 2.58 K/UL (ref 1–4.8)
LYMPHOCYTES NFR BLD: 22.2 % (ref 22–41)
MAGNESIUM SERPL-MCNC: 1.6 MG/DL (ref 1.5–2.5)
MCH RBC QN AUTO: 30.5 PG (ref 27–33)
MCHC RBC AUTO-ENTMCNC: 33.3 G/DL (ref 33.7–35.3)
MCV RBC AUTO: 91.4 FL (ref 81.4–97.8)
MONOCYTES # BLD AUTO: 0.76 K/UL (ref 0–0.85)
MONOCYTES NFR BLD AUTO: 6.6 % (ref 0–13.4)
NEUTROPHILS # BLD AUTO: 8.05 K/UL (ref 1.82–7.42)
NEUTROPHILS NFR BLD: 69.4 % (ref 44–72)
NRBC # BLD AUTO: 0 K/UL
NRBC BLD-RTO: 0 /100 WBC
PLATELET # BLD AUTO: 231 K/UL (ref 164–446)
PMV BLD AUTO: 11.2 FL (ref 9–12.9)
POTASSIUM SERPL-SCNC: 3.8 MMOL/L (ref 3.6–5.5)
PROT SERPL-MCNC: 6.3 G/DL (ref 6–8.2)
PROTHROMBIN TIME: 29.6 SEC (ref 12–14.6)
RBC # BLD AUTO: 5.91 M/UL (ref 4.7–6.1)
SODIUM SERPL-SCNC: 138 MMOL/L (ref 135–145)
TSH SERPL DL<=0.005 MIU/L-ACNC: 2.83 UIU/ML (ref 0.38–5.33)
VIT B12 SERPL-MCNC: 470 PG/ML (ref 211–911)
WBC # BLD AUTO: 11.6 K/UL (ref 4.8–10.8)

## 2018-06-26 PROCEDURE — A9270 NON-COVERED ITEM OR SERVICE: HCPCS | Performed by: HOSPITALIST

## 2018-06-26 PROCEDURE — 700105 HCHG RX REV CODE 258

## 2018-06-26 PROCEDURE — 700111 HCHG RX REV CODE 636 W/ 250 OVERRIDE (IP): Performed by: HOSPITALIST

## 2018-06-26 PROCEDURE — 700102 HCHG RX REV CODE 250 W/ 637 OVERRIDE(OP): Performed by: INTERNAL MEDICINE

## 2018-06-26 PROCEDURE — 80053 COMPREHEN METABOLIC PANEL: CPT

## 2018-06-26 PROCEDURE — 770006 HCHG ROOM/CARE - MED/SURG/GYN SEMI*

## 2018-06-26 PROCEDURE — 99232 SBSQ HOSP IP/OBS MODERATE 35: CPT | Performed by: HOSPITALIST

## 2018-06-26 PROCEDURE — 84153 ASSAY OF PSA TOTAL: CPT

## 2018-06-26 PROCEDURE — 700102 HCHG RX REV CODE 250 W/ 637 OVERRIDE(OP): Performed by: HOSPITALIST

## 2018-06-26 PROCEDURE — 82607 VITAMIN B-12: CPT

## 2018-06-26 PROCEDURE — 83735 ASSAY OF MAGNESIUM: CPT

## 2018-06-26 PROCEDURE — 82306 VITAMIN D 25 HYDROXY: CPT

## 2018-06-26 PROCEDURE — 84443 ASSAY THYROID STIM HORMONE: CPT

## 2018-06-26 PROCEDURE — A9270 NON-COVERED ITEM OR SERVICE: HCPCS | Performed by: INTERNAL MEDICINE

## 2018-06-26 PROCEDURE — 36415 COLL VENOUS BLD VENIPUNCTURE: CPT

## 2018-06-26 PROCEDURE — 85025 COMPLETE CBC W/AUTO DIFF WBC: CPT

## 2018-06-26 PROCEDURE — 82962 GLUCOSE BLOOD TEST: CPT

## 2018-06-26 PROCEDURE — 82746 ASSAY OF FOLIC ACID SERUM: CPT

## 2018-06-26 PROCEDURE — 700105 HCHG RX REV CODE 258: Performed by: HOSPITALIST

## 2018-06-26 PROCEDURE — 85610 PROTHROMBIN TIME: CPT

## 2018-06-26 RX ORDER — CYCLOBENZAPRINE HCL 10 MG
10 TABLET ORAL 3 TIMES DAILY PRN
Status: DISCONTINUED | OUTPATIENT
Start: 2018-06-26 | End: 2018-06-27 | Stop reason: HOSPADM

## 2018-06-26 RX ORDER — SODIUM CHLORIDE 9 MG/ML
INJECTION, SOLUTION INTRAVENOUS
Status: COMPLETED
Start: 2018-06-26 | End: 2018-06-26

## 2018-06-26 RX ORDER — AMOXICILLIN AND CLAVULANATE POTASSIUM 875; 125 MG/1; MG/1
1 TABLET, FILM COATED ORAL EVERY 12 HOURS
Status: DISCONTINUED | OUTPATIENT
Start: 2018-06-26 | End: 2018-06-27 | Stop reason: HOSPADM

## 2018-06-26 RX ORDER — ERGOCALCIFEROL 1.25 MG/1
50000 CAPSULE ORAL
Status: DISCONTINUED | OUTPATIENT
Start: 2018-06-26 | End: 2018-06-27 | Stop reason: HOSPADM

## 2018-06-26 RX ADMIN — LATANOPROST 1 DROP: 50 SOLUTION OPHTHALMIC at 21:29

## 2018-06-26 RX ADMIN — DIAZEPAM 2 MG: 2 TABLET ORAL at 15:36

## 2018-06-26 RX ADMIN — INSULIN HUMAN 3 UNITS: 100 INJECTION, SOLUTION PARENTERAL at 21:26

## 2018-06-26 RX ADMIN — METFORMIN HYDROCHLORIDE 1000 MG: 500 TABLET, FILM COATED ORAL at 08:06

## 2018-06-26 RX ADMIN — SODIUM CHLORIDE 500 ML: 9 INJECTION, SOLUTION INTRAVENOUS at 00:59

## 2018-06-26 RX ADMIN — AMOXICILLIN AND CLAVULANATE POTASSIUM 1 TABLET: 875; 125 TABLET, FILM COATED ORAL at 17:04

## 2018-06-26 RX ADMIN — METOPROLOL TARTRATE 25 MG: 25 TABLET, FILM COATED ORAL at 21:30

## 2018-06-26 RX ADMIN — FLUCONAZOLE 100 MG: 200 TABLET ORAL at 08:06

## 2018-06-26 RX ADMIN — AMPICILLIN SODIUM AND SULBACTAM SODIUM 3 G: 2; 1 INJECTION, POWDER, FOR SOLUTION INTRAMUSCULAR; INTRAVENOUS at 06:26

## 2018-06-26 RX ADMIN — METFORMIN HYDROCHLORIDE 1000 MG: 500 TABLET, FILM COATED ORAL at 17:04

## 2018-06-26 RX ADMIN — POTASSIUM CHLORIDE 20 MEQ: 1500 TABLET, EXTENDED RELEASE ORAL at 08:06

## 2018-06-26 RX ADMIN — TRAZODONE HYDROCHLORIDE 50 MG: 50 TABLET ORAL at 00:57

## 2018-06-26 RX ADMIN — HYDROCODONE BITARTRATE AND ACETAMINOPHEN 1 TABLET: 7.5; 325 TABLET ORAL at 05:02

## 2018-06-26 RX ADMIN — INSULIN GLARGINE 33 UNITS: 100 INJECTION, SOLUTION SUBCUTANEOUS at 21:25

## 2018-06-26 RX ADMIN — LISINOPRIL 5 MG: 5 TABLET ORAL at 08:06

## 2018-06-26 RX ADMIN — INSULIN HUMAN 7 UNITS: 100 INJECTION, SOLUTION PARENTERAL at 11:37

## 2018-06-26 RX ADMIN — ERGOCALCIFEROL 50000 UNITS: 1.25 CAPSULE ORAL at 15:36

## 2018-06-26 RX ADMIN — DOCUSATE SODIUM -SENNOSIDES 2 TABLET: 50; 8.6 TABLET, COATED ORAL at 21:30

## 2018-06-26 RX ADMIN — AMPICILLIN SODIUM AND SULBACTAM SODIUM 3 G: 2; 1 INJECTION, POWDER, FOR SOLUTION INTRAMUSCULAR; INTRAVENOUS at 00:30

## 2018-06-26 RX ADMIN — CYCLOBENZAPRINE 10 MG: 10 TABLET, FILM COATED ORAL at 15:36

## 2018-06-26 RX ADMIN — SERTRALINE 50 MG: 50 TABLET, FILM COATED ORAL at 08:06

## 2018-06-26 RX ADMIN — ATORVASTATIN CALCIUM 10 MG: 10 TABLET, FILM COATED ORAL at 21:30

## 2018-06-26 RX ADMIN — INSULIN HUMAN 7 UNITS: 100 INJECTION, SOLUTION PARENTERAL at 17:09

## 2018-06-26 RX ADMIN — AMPICILLIN SODIUM AND SULBACTAM SODIUM 3 G: 2; 1 INJECTION, POWDER, FOR SOLUTION INTRAMUSCULAR; INTRAVENOUS at 11:37

## 2018-06-26 RX ADMIN — WARFARIN SODIUM 10 MG: 10 TABLET ORAL at 17:04

## 2018-06-26 RX ADMIN — METOPROLOL TARTRATE 25 MG: 25 TABLET, FILM COATED ORAL at 08:06

## 2018-06-26 ASSESSMENT — ENCOUNTER SYMPTOMS
ABDOMINAL PAIN: 0
FEVER: 0
ROS GI COMMENTS: NO APPETITE
BLOOD IN STOOL: 0
FOCAL WEAKNESS: 0
FALLS: 1
SHORTNESS OF BREATH: 1
VOMITING: 0
NERVOUS/ANXIOUS: 1
DIARRHEA: 0
NECK PAIN: 1
CHILLS: 0
BACK PAIN: 1
CONSTIPATION: 0
COUGH: 0
NAUSEA: 0
SORE THROAT: 0
DIZZINESS: 0
WEAKNESS: 1
WEIGHT LOSS: 1
WHEEZING: 0

## 2018-06-26 ASSESSMENT — PAIN SCALES - GENERAL
PAINLEVEL_OUTOF10: 9
PAINLEVEL_OUTOF10: 0
PAINLEVEL_OUTOF10: 4
PAINLEVEL_OUTOF10: 3
PAINLEVEL_OUTOF10: 6
PAINLEVEL_OUTOF10: 0
PAINLEVEL_OUTOF10: 3

## 2018-06-26 NOTE — WOUND TEAM
Wound consult placed for evaluation of buttocks and perineum.  Found red, intact and blanching skin.  Applied nickel thick layer zinc protective cream to buttocks and scrotum and perineum.  Instructed patient to turn side to side when in bed.  Discussed with staff.  No involvement by wound team at this time.

## 2018-06-26 NOTE — PROGRESS NOTES
Assumed care of patient.  Patients IV infiltrated this morning. IV pulled, will have ultrasound guided IV placed before 1200 antibiotic.  Patient incontinent of urine this morning. One person assist up to the chair. Patient stating pain is improved. No nausea noted. Sacrum is red. Srinivas cream placed. Penis is red. Stressed importance of keeping skin clean, and dry. No other needs at this time. Call light within reach.

## 2018-06-26 NOTE — PROGRESS NOTES
AA&Ox4. VSS. SpO2 91% on RA. Denies SOB.  Reporting 8/10 pain. Medicated per MAR.   Redness noted to groin and scortum. Sacrum RO, skin blanching. Zinc paste applied.  Tear noted to left second toe.  BLE discolored and karishma.  Tolerating diabetic diet. Denies N/V.  + void. LBM 6/25/18.   All needs met at this time. Call light within reach. Pt calls appropriately.

## 2018-06-26 NOTE — CARE PLAN
Problem: Infection  Goal: Will remain free from infection  IV antibiotics ordered and administered.    Problem: Mobility  Goal: Risk for activity intolerance will decrease  Patient one to two person assist depending on amount of mobility. Patient spends much of the shift sitting up in a chair.
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
## 2018-06-26 NOTE — PROGRESS NOTES
Inpatient Anticoagulation Service Note    Date: 6/26/2018  Reason for Anticoagulation: Atrial Fibrillation, Deep Vein Thrombosis   [id number] VASc Score: 6    Hemoglobin Value: 18  Hematocrit Value: (!) 54  Lab Platelet Value: 231  Target INR: 2.0 to 3.0    INR from last 7 days     Date/Time INR Value    06/26/18 0831 (!)  2.85    06/25/18 0219 (!)  2.64    06/24/18 1920 (!)  2.84        Dose from last 7 days     Date/Time Dose (mg)    06/26/18 1300  10    06/25/18 1400  7.5    06/24/18 1920  10        Average Dose (mg):  (Home med: 7.5 mg on MWF, and 10 mg all other days)  Significant Interactions: Antibiotics, Azoles, Statin (Augmentin, fluconazole (6/25 x1), sertraline )  Bridge Therapy: No    Comments:  INR remains therapeutic today. H/H stable. No reported s/sx of bleeding. Patient was started on Unasyn on 6/24, transitioned to Augmentin today. Fluconazole started yesterday x1 dose and DC'ed today. Will plan to continue on home regimen. Repeat INR in AM due to new drug-drug interaction. Pharmacist will continue to monitor daily and adjust dose accordingly. If INR remains within goal, change to MWF INR's.     Plan:  10 mg PO today, repeat INR in AM   Education Material Provided?: No (chronic warfarin pt)  Pharmacist suggested discharge dosing: continue on home regimen 7.5 mg PO MWF, 10 mg all other days. repeat INR in 4-5 days after discharge       Tonie Lopez, Pharm.D
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
## 2018-06-26 NOTE — ASSESSMENT & PLAN NOTE
uncomplicated opiate dependency we will continue his Norco  No Indication to escalate medications at this time

## 2018-06-27 VITALS
OXYGEN SATURATION: 94 % | HEART RATE: 72 BPM | BODY MASS INDEX: 28.04 KG/M2 | RESPIRATION RATE: 18 BRPM | DIASTOLIC BLOOD PRESSURE: 90 MMHG | WEIGHT: 207 LBS | HEIGHT: 72 IN | SYSTOLIC BLOOD PRESSURE: 154 MMHG | TEMPERATURE: 97.8 F

## 2018-06-27 LAB
GLUCOSE BLD-MCNC: 172 MG/DL (ref 65–99)
GLUCOSE BLD-MCNC: 240 MG/DL (ref 65–99)
PSA FREE MFR SERPL: NORMAL %
PSA FREE SERPL-MCNC: NORMAL NG/ML
PSA SERPL-MCNC: 1 NG/ML (ref 0–4)

## 2018-06-27 PROCEDURE — A9270 NON-COVERED ITEM OR SERVICE: HCPCS | Performed by: INTERNAL MEDICINE

## 2018-06-27 PROCEDURE — A9270 NON-COVERED ITEM OR SERVICE: HCPCS | Performed by: HOSPITALIST

## 2018-06-27 PROCEDURE — 700102 HCHG RX REV CODE 250 W/ 637 OVERRIDE(OP): Performed by: HOSPITALIST

## 2018-06-27 PROCEDURE — 700102 HCHG RX REV CODE 250 W/ 637 OVERRIDE(OP): Performed by: INTERNAL MEDICINE

## 2018-06-27 PROCEDURE — 82962 GLUCOSE BLOOD TEST: CPT

## 2018-06-27 PROCEDURE — 99239 HOSP IP/OBS DSCHRG MGMT >30: CPT | Performed by: HOSPITALIST

## 2018-06-27 RX ORDER — ERGOCALCIFEROL 1.25 MG/1
50000 CAPSULE ORAL
Qty: 8 CAP | Refills: 0 | Status: SHIPPED | OUTPATIENT
Start: 2018-07-03

## 2018-06-27 RX ORDER — CYCLOBENZAPRINE HCL 10 MG
10 TABLET ORAL 3 TIMES DAILY PRN
Qty: 30 TAB | Refills: 0 | Status: SHIPPED | OUTPATIENT
Start: 2018-06-27

## 2018-06-27 RX ORDER — AMOXICILLIN AND CLAVULANATE POTASSIUM 875; 125 MG/1; MG/1
1 TABLET, FILM COATED ORAL EVERY 12 HOURS
Qty: 8 TAB | Refills: 0 | Status: SHIPPED | OUTPATIENT
Start: 2018-06-27 | End: 2018-07-01

## 2018-06-27 RX ADMIN — TRAZODONE HYDROCHLORIDE 50 MG: 50 TABLET ORAL at 00:17

## 2018-06-27 RX ADMIN — HYDROCODONE BITARTRATE AND ACETAMINOPHEN 1 TABLET: 7.5; 325 TABLET ORAL at 03:14

## 2018-06-27 RX ADMIN — INSULIN HUMAN 3 UNITS: 100 INJECTION, SOLUTION PARENTERAL at 06:01

## 2018-06-27 RX ADMIN — ACETAMINOPHEN 650 MG: 325 TABLET, FILM COATED ORAL at 00:17

## 2018-06-27 RX ADMIN — INSULIN HUMAN 4 UNITS: 100 INJECTION, SOLUTION PARENTERAL at 12:08

## 2018-06-27 RX ADMIN — POTASSIUM CHLORIDE 20 MEQ: 1500 TABLET, EXTENDED RELEASE ORAL at 08:15

## 2018-06-27 RX ADMIN — SERTRALINE 50 MG: 50 TABLET, FILM COATED ORAL at 08:15

## 2018-06-27 RX ADMIN — METOPROLOL TARTRATE 25 MG: 25 TABLET, FILM COATED ORAL at 08:16

## 2018-06-27 RX ADMIN — METFORMIN HYDROCHLORIDE 1000 MG: 500 TABLET, FILM COATED ORAL at 08:14

## 2018-06-27 RX ADMIN — LISINOPRIL 5 MG: 5 TABLET ORAL at 08:15

## 2018-06-27 RX ADMIN — AMOXICILLIN AND CLAVULANATE POTASSIUM 1 TABLET: 875; 125 TABLET, FILM COATED ORAL at 08:14

## 2018-06-27 ASSESSMENT — PAIN SCALES - GENERAL
PAINLEVEL_OUTOF10: 0
PAINLEVEL_OUTOF10: 3
PAINLEVEL_OUTOF10: 10
PAINLEVEL_OUTOF10: 8

## 2018-06-27 NOTE — PROGRESS NOTES
Assumed care of patient at 1900.    Pt is A&O x4. Pt agitated.   Denies pain and nausea  Tolerating diabetic diet  Redness noted to groin and scrotum, area cleansed and left OR. Zinc paste applied. Tear to left second toe.   BLE karishma with diminished pulses. Pt reports numbness to bilateral feet due to neuropathy.  Pt voids in urinal with assistance and is incontinent at times.   + flatus, bowel sounds audible and active all quadrants  Up with 1-2 assist  SCD's refused  Bed in lowest position and locked.  Bed alarm refused  Pt resting comfortably now.  Review plan of care with patient  Call light within reach  All needs met at this time

## 2018-06-27 NOTE — DISCHARGE INSTRUCTIONS
Discharge Instructions    Discharged to home by car with relative. Discharged via wheelchair, hospital escort: Yes.  Special equipment needed: Not Applicable    Be sure to schedule a follow-up appointment with your primary care doctor or any specialists as instructed.     Discharge Plan:   Smoking Cessation Offered: Patient Refused  Influenza Vaccine Indication: Indicated: Not available from distributor/    I understand that a diet low in cholesterol, fat, and sodium is recommended for good health. Unless I have been given specific instructions below for another diet, I accept this instruction as my diet prescription.   Other diet: Diabetic    Special Instructions: Patient must follow up with coumadin clinic. [facility].    · Is patient discharged on Warfarin / Coumadin?   Yes    You are receiving the drug warfarin. Please understand the importance of monitoring warfarin with scheduled PT/INR blood draws.  Follow-up with the Coumadin Clinic in one week for INR lab..    IMPORTANT: HOW TO USE THIS INFORMATION:  This is a summary and does NOT have all possible information about this product. This information does not assure that this product is safe, effective, or appropriate for you. This information is not individual medical advice and does not substitute for the advice of your health care professional. Always ask your health care professional for complete information about this product and your specific health needs.      WARFARIN - ORAL (WARF-uh-rin)      COMMON BRAND NAME(S): Coumadin      WARNING:  Warfarin can cause very serious (possibly fatal) bleeding. This is more likely to occur when you first start taking this medication or if you take too much warfarin. To decrease your risk for bleeding, your doctor or other health care provider will monitor you closely and check your lab results (INR test) to make sure you are not taking too much warfarin.  "Keep all medical and laboratory appointments. Tell your doctor right away if you notice any signs of serious bleeding. See also Side Effects section.      USES:  This medication is used to treat blood clots (such as in deep vein thrombosis-DVT or pulmonary embolus-PE) and/or to prevent new clots from forming in your body. Preventing harmful blood clots helps to reduce the risk of a stroke or heart attack. Conditions that increase your risk of developing blood clots include a certain type of irregular heart rhythm (atrial fibrillation), heart valve replacement, recent heart attack, and certain surgeries (such as hip/knee replacement). Warfarin is commonly called a \"blood thinner,\" but the more correct term is \"anticoagulant.\" It helps to keep blood flowing smoothly in your body by decreasing the amount of certain substances (clotting proteins) in your blood.      HOW TO USE:  Read the Medication Guide provided by your pharmacist before you start taking warfarin and each time you get a refill. If you have any questions, ask your doctor or pharmacist. Take this medication by mouth with or without food as directed by your doctor or other health care professional, usually once a day. It is very important to take it exactly as directed. Do not increase the dose, take it more frequently, or stop using it unless directed by your doctor. Dosage is based on your medical condition, laboratory tests (such as INR), and response to treatment. Your doctor or other health care provider will monitor you closely while you are taking this medication to determine the right dose for you. Use this medication regularly to get the most benefit from it. To help you remember, take it at the same time each day. It is important to eat a balanced, consistent diet while taking warfarin. Some foods can affect how warfarin works in your body and may affect your treatment and dose. Avoid sudden large increases or decreases in your intake of foods " high in vitamin K (such as broccoli, cauliflower, cabbage, brussels sprouts, kale, spinach, and other green leafy vegetables, liver, green tea, certain vitamin supplements). If you are trying to lose weight, check with your doctor before you try to go on a diet. Cranberry products may also affect how your warfarin works. Limit the amount of cranberry juice (16 ounces/480 milliliters a day) or other cranberry products you may drink or eat.      SIDE EFFECTS:  Nausea, loss of appetite, or stomach/abdominal pain may occur. If any of these effects persist or worsen, tell your doctor or pharmacist promptly. Remember that your doctor has prescribed this medication because he or she has judged that the benefit to you is greater than the risk of side effects. Many people using this medication do not have serious side effects. This medication can cause serious bleeding if it affects your blood clotting proteins too much (shown by unusually high INR lab results). Even if your doctor stops your medication, this risk of bleeding can continue for up to a week. Tell your doctor right away if you have any signs of serious bleeding, including: unusual pain/swelling/discomfort, unusual/easy bruising, prolonged bleeding from cuts or gums, persistent/frequent nosebleeds, unusually heavy/prolonged menstrual flow, pink/dark urine, coughing up blood, vomit that is bloody or looks like coffee grounds, severe headache, dizziness/fainting, unusual or persistent tiredness/weakness, bloody/black/tarry stools, chest pain, shortness of breath, difficulty swallowing. Tell your doctor right away if any of these unlikely but serious side effects occur: persistent nausea/vomiting, severe stomach/abdominal pain, yellowing eyes/skin. This drug rarely has caused very serious (possibly fatal) problems if its effects lead to small blood clots (usually at the beginning of treatment). This can lead to severe skin/tissue damage that may require surgery or  amputation if left untreated. Patients with certain blood conditions (protein C or S deficiency) may be at greater risk. Get medical help right away if any of these rare but serious side effects occur: painful/red/purplish patches on the skin (such as on the toe, breast, abdomen), change in the amount of urine, vision changes, confusion, slurred speech, weakness on one side of the body. A very serious allergic reaction to this drug is rare. However, get medical help right away if you notice any symptoms of a serious allergic reaction, including: rash, itching/swelling (especially of the face/tongue/throat), severe dizziness, trouble breathing. This is not a complete list of possible side effects. If you notice other effects not listed above, contact your doctor or pharmacist. In the [place] - Call your doctor for medical advice about side effects. You may report side effects to FDA at 8-479-OVK-2950. In [place] - Call your doctor for medical advice about side effects. You may report side effects to [facility] [place] at [phone].      PRECAUTIONS:  Before taking warfarin, tell your doctor or pharmacist if you are allergic to it; or if you have any other allergies. This product may contain inactive ingredients, which can cause allergic reactions or other problems. Talk to your pharmacist for more details. Before using this medication, tell your doctor or pharmacist your medical history, especially of: blood disorders (such as anemia, hemophilia), bleeding problems (such as bleeding of the stomach/intestines, bleeding in the brain), blood vessel disorders (such as aneurysms), recent major injury/surgery, liver disease, alcohol use, mental/mood disorders (including memory problems), frequent falls/injuries. It is important that all your doctors and dentists know that you take warfarin. Before having surgery or any medical/dental procedures, tell your doctor or dentist that you are taking this medication and about all the  products you use (including prescription drugs, nonprescription drugs, and herbal products). Avoid getting injections into the muscles. If you must have an injection into a muscle (for example, a flu shot), it should be given in the arm. This way, it will be easier to check for bleeding and/or apply pressure bandages. This medication may cause stomach bleeding. Daily use of alcohol while using this medicine will increase your risk for stomach bleeding and may also affect how this medication works. Limit or avoid alcoholic beverages. If you have not been eating well, if you have an illness or infection that causes fever, vomiting, or diarrhea for more than 2 days, or if you start using any antibiotic medications, contact your doctor or pharmacist immediately because these conditions can affect how warfarin works. This medication can cause heavy bleeding. To lower the chance of getting cut, bruised, or injured, use great caution with sharp objects like safety razors and nail cutters. Use an electric razor when shaving and a soft toothbrush when brushing your teeth. Avoid activities such as contact sports. If you fall or injure yourself, especially if you hit your head, call your doctor immediately. Your doctor may need to check you. The Food & Drug Administration has stated that generic warfarin products are interchangeable. However, consult your doctor or pharmacist before switching warfarin products. Be careful not to take more than one medication that contains warfarin unless specifically directed by the doctor or health care provider who is monitoring your warfarin treatment. Older adults may be at greater risk for bleeding while using this drug. This medication is not recommended for use during pregnancy because of serious (possibly fatal) harm to an unborn baby. Discuss the use of reliable forms of birth control with your doctor. If you become pregnant or think you may be pregnant, tell your doctor immediately.  "If you are planning pregnancy, discuss a plan for managing your condition with your doctor before you become pregnant. Your doctor may switch the type of medication you use during pregnancy. Very small amounts of this medication may pass into breast milk but is unlikely to harm a nursing infant. Consult your doctor before breast-feeding.      DRUG INTERACTIONS:  Drug interactions may change how your medications work or increase your risk for serious side effects. This document does not contain all possible drug interactions. Keep a list of all the products you use (including prescription/nonprescription drugs and herbal products) and share it with your doctor and pharmacist. Do not start, stop, or change the dosage of any medicines without your doctor's approval. Warfarin interacts with many prescription, nonprescription, vitamin, and herbal products. This includes medications that are applied to the skin or inside the vagina or rectum. The interactions with warfarin usually result in an increase or decrease in the \"blood-thinning\" (anticoagulant) effect. Your doctor or other health care professional should closely monitor you to prevent serious bleeding or clotting problems. While taking warfarin, it is very important to tell your doctor or pharmacist of any changes in medications, vitamins, or herbal products that you are taking. Some products that may interact with this drug include: capecitabine, imatinib, mifepristone. Aspirin, aspirin-like drugs (salicylates), and nonsteroidal anti-inflammatory drugs (NSAIDs such as ibuprofen, naproxen, celecoxib) may have effects similar to warfarin. These drugs may increase the risk of bleeding problems if taken during treatment with warfarin. Carefully check all prescription/nonprescription product labels (including drugs applied to the skin such as pain-relieving creams) since the products may contain NSAIDs or salicylates. Talk to your doctor about using a different " medication (such as acetaminophen) to treat pain/fever. Low-dose aspirin and related drugs (such as clopidogrel, ticlopidine) should be continued if prescribed by your doctor for specific medical reasons such as heart attack or stroke prevention. Consult your doctor or pharmacist for more details. Many herbal products interact with warfarin. Tell your doctor before taking any herbal products, especially bromelains, coenzyme Q10, cranberry, danshen, dong quai, fenugreek, garlic, ginkgo biloba, ginseng, and Boissevain's wort, among others. This medication may interfere with a certain laboratory test to measure theophylline levels, possibly causing false test results. Make sure laboratory personnel and all your doctors know you use this drug.      OVERDOSE:  If overdose is suspected, contact a poison control center or emergency room immediately. [place] residents can call the [place] National Poison Hotline at 1-838.921.3557. [place] residents can call a provincial poison control center. Symptoms of overdose may include: bloody/black/tarry stools, pink/dark urine, unusual/prolonged bleeding.      NOTES:  Do not share this medication with others. Laboratory and/or medical tests (such as INR, complete blood count) must be performed periodically to monitor your progress or check for side effects. Consult your doctor for more details.      MISSED DOSE:  For the best possible benefit, do not miss any doses. If you do miss a dose and remember on the same day, take it as soon as you remember. If you remember on the next day, skip the missed dose and resume your usual dosing schedule. Do not double the dose to catch up because this could increase your risk for bleeding. Keep a record of missed doses to give to your doctor or pharmacist. Contact your doctor or pharmacist if you miss 2 or more doses in a row.      STORAGE:  Store at room temperature away from light and moisture. Do not store in the bathroom. Keep all medications away from  children and pets. Do not flush medications down the toilet or pour them into a drain unless instructed to do so. Properly discard this product when it is  or no longer needed. Consult your pharmacist or local waste disposal company for more details about how to safely discard your product.      MEDICAL ALERT:  Your condition and medication can cause complications in a medical emergency. For information about enrolling in MedicAlert, call 1-228.371.8220 ([place]) or [phone] ([place]).      Information last revised 2010 Copyright(c)  [employer]         Cellulitis, Adult  Introduction  Cellulitis is a skin infection. The infected area is usually red and sore. This condition occurs most often in the arms and lower legs. It is very important to get treated for this condition.  Follow these instructions at home:  · Take over-the-counter and prescription medicines only as told by your doctor.  · If you were prescribed an antibiotic medicine, take it as told by your doctor. Do not stop taking the antibiotic even if you start to feel better.  · Drink enough fluid to keep your pee (urine) clear or pale yellow.  · Do not touch or rub the infected area.  · Raise (elevate) the infected area above the level of your heart while you are sitting or lying down.  · Place warm or cold wet cloths (warm or cold compresses) on the infected area. Do this as told by your doctor.  · Keep all follow-up visits as told by your doctor. This is important. These visits let your doctor make sure your infection is not getting worse.  Contact a doctor if:  · You have a fever.  · Your symptoms do not get better after 1-2 days of treatment.  · Your bone or joint under the infected area starts to hurt after the skin has healed.  · Your infection comes back. This can happen in the same area or another area.  · You have a swollen bump in the infected area.  · You have new symptoms.  · You feel ill and also have muscle aches  and pains.  Get help right away if:  · Your symptoms get worse.  · You feel very sleepy.  · You throw up (vomit) or have watery poop (diarrhea) for a long time.  · There are red streaks coming from the infected area.  · Your red area gets larger.  · Your red area turns darker.  This information is not intended to replace advice given to you by your health care provider. Make sure you discuss any questions you have with your health care provider.  Document Released: 06/05/2009 Document Revised: 05/25/2017 Document Reviewed: 10/26/2016  © 2017 [employer]      Depression / Suicide Risk    As you are discharged from this [facility] facility, it is important to learn how to keep safe from harming yourself.    Recognize the warning signs:  · Abrupt changes in personality, positive or negative- including increase in energy   · Giving away possessions  · Change in eating patterns- significant weight changes-  positive or negative  · Change in sleeping patterns- unable to sleep or sleeping all the time   · Unwillingness or inability to communicate  · Depression  · Unusual sadness, discouragement and loneliness  · Talk of wanting to die  · Neglect of personal appearance   · Rebelliousness- reckless behavior  · Withdrawal from people/activities they love  · Confusion- inability to concentrate     If you or a loved one observes any of these behaviors or has concerns about self-harm, here's what you can do:  · Talk about it- your feelings and reasons for harming yourself  · Remove any means that you might use to hurt yourself (examples: pills, rope, extension cords, firearm)  · Get professional help from the community (Mental Health, Substance Abuse, psychological counseling)  · Do not be alone:Call your Safe Contact- someone whom you trust who will be there for you.  · Call your local CRISIS HOTLINE 924-5193 or 147-631-5828  · Call your local Children's Mobile Crisis Response Team [place] (088) 248-7742 or  www.[employer]  · Call the toll free National Suicide Prevention Hotlines   · National Suicide Prevention Lifeline 751-096-VLZH (2636)  · National Hope Line Network 800-SUICIDE (225-4117)

## 2018-06-27 NOTE — CARE PLAN
Problem: Nutritional:  Goal: Achieve adequate nutritional intake  Patient will consume 50% of meals   Outcome: MET Date Met: 06/27/18  Per chart review of ADL's, PO consistently % of meals.   Saw pt at bedside and pt reports his appetite is good.  Consult RD as needed, available prn and to rescreen per department policy.

## 2018-06-27 NOTE — PROGRESS NOTES
Patient discharged home. All lines removed. Armband removed. Discharge instructions and prescriptions reviewed and understanding verbalized. Patient states they will fill prescriptions. Patient states that he will make follow up appointments including those with a diabetes educator and coumadin clinic. Patient states that they will return to emergency if discussed symptoms arise. Patient left via wheelchair was escorted of property by staff. All personal items were removed from the room prior to departure. Medications from drawer removed and sent back to pharmacy or disposed of per protocol. Chart given to unit clerk .

## 2018-06-27 NOTE — PROGRESS NOTES
[facility] Hospitalist Progress Note    Date of Service: 6/26/2018    Chief Complaint  83 y.o. diabetic debilitated male admitted 6/24/2018 with altered mentation, penile inflammation, concern for sepsis and elevated blood sugar>700.  He was disheveled and had dry adherent feces.    Patient is a tangential historian  The concise version of his worry is that since last Wednesday he has had a lot of stressful events, over the weekend he started to feel progressively more fatigued, he had some point had fallen but was able to get up.  By Sunday he was feeling so poorly that he came to the emergency room.    Interval Problem Update  6/25: Did not complain of neck or back pain on arrival and felt fine last night when he fell asleep in his hospital bed.  However today he complains of neck and back pain and wants x-rays done.  6/26: Discussed patient's x-rays with him which just showed degenerative disease. Patient continues to have back and neck pain. Added Flexeril when necessary. Order diabetes education as patient is not sure how to use insulin pens. Changed antibiotics to oral Augmentin and DC fluconazole.    Consultants/Specialty  Palliative care-patient with multiple comorbidities and advanced age    Disposition  tbd        Review of Systems   Constitutional: Positive for malaise/fatigue (Progressive over the last 3-4 days) and weight loss (30 pound). Negative for chills and fever.   HENT: Negative for hearing loss and sore throat.    Respiratory: Positive for shortness of breath (Seems to be at baseline). Negative for cough and wheezing.    Cardiovascular: Negative for chest pain.   Gastrointestinal: Negative for abdominal pain, blood in stool, constipation, diarrhea, nausea and vomiting.        No appetite   Genitourinary: Negative for dysuria.        BPH issue   Musculoskeletal: Positive for back pain, falls and neck pain.   Neurological: Positive for weakness. Negative for dizziness and focal weakness.    Psychiatric/Behavioral: The patient is nervous/anxious.       Physical Exam  Laboratory/Imaging   Hemodynamics  Temp (24hrs), Av.6 °C (97.8 °F), Min:36.1 °C (97 °F), Max:36.8 °C (98.3 °F)   Temperature: 36.5 °C (97.7 °F)  Pulse  Av.4  Min: 60  Max: 121   Blood Pressure : 143/86      Respiratory      Respiration: 16, Pulse Oximetry: 94 %     Work Of Breathing / Effort: Mild       Fluids    Intake/Output Summary (Last 24 hours) at 18 1810  Last data filed at 18 1635   Gross per 24 hour   Intake             1140 ml   Output              840 ml   Net              300 ml       Nutrition  Orders Placed This Encounter   Procedures   • Diet Order Diabetic     Standing Status:   Standing     Number of Occurrences:   1     Order Specific Question:   Diet:     Answer:   Diabetic [3]     Physical Exam   Constitutional: He is oriented to person, place, and time. He appears well-developed. No distress.   A large pannus, lots of loose skin supporting his claim of weight loss  Disheveled   HENT:   Head: Normocephalic and atraumatic.   Edentulous   Eyes: Conjunctivae are normal.   Pulmonary/Chest: No respiratory distress. He has no wheezes. He has no rales. He exhibits no tenderness.   Times when he is talking he needs to stop and do some auto PEEPing, then he catches his breath and continues  Breath sounds are markedly diminished   Abdominal: Soft. He exhibits distension (Pendulous pannus, decreased bowel sounds). There is no tenderness. There is no rebound and no guarding.   Genitourinary:   Genitourinary Comments: He is uncircumcised, there is no visible erythema  Media photos of perineum reviewed   Musculoskeletal: He exhibits edema (Chronic stasis changes of the skin below knees with brawny edema).   Neurological: He is alert and oriented to person, place, and time.   Skin: He is not diaphoretic.   Several amateur tattoos on face  Onychomycosis of the toenail   Psychiatric: His mood appears anxious. His  speech is rapid and/or pressured and tangential. He is agitated. Cognition and memory are normal. He expresses impulsivity and inappropriate judgment.   Nursing note and vitals reviewed.      Recent Labs      06/24/18   0922 06/25/18   0219  06/26/18   0832   WBC  14.3*  10.6  11.6*   RBC  5.68  5.28  5.91   HEMOGLOBIN  17.6  16.2  18.0   HEMATOCRIT  51.8  47.7  54.0*   MCV  91.2  90.3  91.4   MCH  31.0  30.7  30.5   MCHC  34.0  34.0  33.3*   RDW  42.7  41.9  43.2   PLATELETCT  200  206  231   MPV  11.9  11.4  11.2     Recent Labs      06/24/18   0922  06/25/18   0219  06/26/18   0831   SODIUM  132*  140  138   POTASSIUM  4.3  3.4*  3.8   CHLORIDE  94*  105  106   CO2  24  28  23   GLUCOSE  735*  187*  182*   BUN  28*  23*  21   CREATININE  1.14  0.90  0.76   CALCIUM  9.7  8.4*  8.6     Recent Labs      06/24/18   1920  06/25/18   0219  06/26/18   0831   INR  2.84*  2.64*  2.85*     Recent Labs      06/24/18   0922   BNPBTYPENAT  62              Assessment/Plan     * Cellulitis, penis- (present on admission)   Assessment & Plan    This is associated white blood cell count 14.3  WBCs improved on empiric antibiotic, there is no visible redness suspect he has balanitis  Diflucan initially added, now DC'd  Changed to oral Augmentin        Type 2 diabetes mellitus with neurologic complication, with long-term current use of insulin (HCC)- (present on admission)   Assessment & Plan    Very poorly controlled.  Glucose on admission in the emergency room 735  Patient has various excuses, he states his last A1c was 7.  He does not check sugars at home.  Sugars are improved here but still high, will increase Lantus  Diabetes education to see if patient can use insulin pens or if he needs syringes and whether he continues 7030 or Lantus        Paroxysmal atrial fibrillation (HCC)- (present on admission)   Assessment & Plan    Patient is no longer on amiodarone or metoprolol  EKG shows sinus rhythm  Will resume metoprolol.         Opioid dependence ([facility])   Assessment & Plan    uncomplicated opiate dependency we will continue his Norco  No Indication to escalate medications at this time        Tobacco abuse- (present on admission)   Assessment & Plan    Cessation has been encouraged and nicotine patch will be offered        Diabetic polyneuropathy associated with type 2 diabetes mellitus ([facility])- (present on admission)   Assessment & Plan    He is on chronic Norco but no longer takes gabapentin  Is very fixated on receiving his pain medications and they have been ordered        PAF (paroxysmal atrial fibrillation) ([facility])- (present on admission)   Assessment & Plan    Prior history of stroke  Chronically anticoagulated  His INR is actually therapeutic          Back pain- (present on admission)   Assessment & Plan    X-rays show only showed degenerative disease  Added Flexeril when necessary          Quality-Core Measures   Reviewed items::  Labs reviewed and Medications reviewed  Carias catheter::  No Carias  DVT prophylaxis pharmacological::  Warfarin (Coumadin)  Ulcer Prophylaxis::  Not indicated  Antibiotics:  Treating active infection/contamination beyond 24 hours perioperative coverage  Assessed for rehabilitation services:  Patient unable to tolerate rehabilitation therapeutic regimen

## 2018-06-27 NOTE — PROGRESS NOTES
Assumed care of patient from RN at 0700.     Reviewed VS, labs, orders, and notes.     Pt sitting up in xhair . A&Ox 4.    /76   Pulse 85   Temp 36.4 °C (97.6 °F)   Resp 17   Ht 1.829 m (6')   Wt 93.9 kg (207 lb)   SpO2 93%   BMI 28.07 kg/m² . MEWS Score: 1.     On room air. Denies SOB.    Moves all extremities. Baseline numbness and tingling in finger tips and lower extremities.    Skin assessed over bony prominences and under medical devices. Perineal area red, zinc protective cream in place. Voiding in urinal, hyperactive BS, + flatus, LBM 6/26. BLE dusky, flaky, left foot - 2nd toe wound. Pt refusing dressing.     Diabetic diet, denies nausea/ vomiting.     Patient reports 100 /10 pain generalized, previously medicated per MAR.     Pt. is 1-2 assist with FWW. Unsteady gait.    Pt refusing BA. Fall prevention education reinforced with pt. Pt is refusing to wear treaded slipper socks, lower bedrails are down. Pt calls appropriatly.     Discussed POC, all questions answered at this time. Bed is locked and in the lowest position, call light within reach, Q 1 hour rounding in place. All needs met at this time.     Pt is refusing, IV access, , BA, SCD's.

## 2018-06-27 NOTE — PROGRESS NOTES
Diabetes educator has been called three times at extension [phone], first time at 0830 messages have been left twice that education is only barrier to discharge. Have not received a phone call back. I have escalated this to my charge, RN Radha CLINE

## 2018-06-27 NOTE — PROGRESS NOTES
Patient refusing bed alarm despite education. Patient is A&Ox4 with steady gait. Demonstrates proper use of call light and is agreeable to call before getting out of bed. Bed locked in lowest position, upper side rails up, room free of clutter, call light within reach.

## 2018-06-27 NOTE — PROGRESS NOTES
Inpatient Anticoagulation Service Note    Date: 6/27/2018  Reason for Anticoagulation: Atrial Fibrillation, Deep Vein Thrombosis   [id number] VASc Score: 6    Hemoglobin Value: 18  Hematocrit Value: (!) 54  Lab Platelet Value: 231  Target INR: 2.0 to 3.0    INR from last 7 days     Date/Time INR Value    06/26/18 0831 (!)  2.85    06/25/18 0219 (!)  2.64    06/24/18 1920 (!)  2.84        Dose from last 7 days     Date/Time Dose (mg)    06/27/18 1000  7.5    06/26/18 1300  10    06/25/18 1400  7.5    06/24/18 1920  10        Average Dose (mg):  (Home med: 7.5 mg on MWF, and 10 mg all other days)  Significant Interactions: Statin, Augmentin, fluconazole (6/25 x1), sertraline   Bridge Therapy: No  Reversal Agent Administered: Not Applicable    Comments:  Patient is refusing lab draws this morning, discussed with patient about needing to assess his INR, per patient he has been on warfarin a long time and stable on this dose. No reported s/sx of bleeding. Resume home regimen. Patient planning to discharge today. If remains inpatient repeat INR on Friday. Pharmacist will continue to monitor daily and adjust dose accordingly.     Plan:  7.5 mg today, repeat INR Friday.   Education Material Provided?: No  Pharmacist suggested discharge dosing: Resume home regimen 7.5 mg MWF, 10 mg all other days, repeat INR in 2-3 days after discharge       Tonie Lopez, Pharm.D

## 2018-06-28 NOTE — DISCHARGE SUMMARY
Hospital Medicine Discharge Note     Admit Date:  2018       Discharge Date:   2018    Attending Physician:  Chris Li     Diagnoses (includes active and resolved):   Principal Problem:    Cellulitis, penis POA: Yes  Active Problems:    Type 2 diabetes mellitus with neurologic complication, with long-term current use of insulin (HCC) POA: Yes    Paroxysmal atrial fibrillation (HCC) POA: Yes    Back pain POA: Yes    PAF (paroxysmal atrial fibrillation) (HCC) POA: Yes    Diabetic polyneuropathy associated with type 2 diabetes mellitus (HCC) POA: Yes    Tobacco abuse POA: Yes    Opioid dependence (HCC) POA: Unknown    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 *      Hospital Summary (Brief Narrative):       83 y.o. male who presented 2018 with weakness w/h/o insulin-dependent diabetes mellitus though has not been taking his insulin.  In the emergency room he was perseverating about his driving test and the stress that is placed on him. Per records, he was brought in because he was was weak and is sitting in his feces quite dirty.  In the emergency room, his blood sugar was found to be over 700 and he is cellulitis around his penis though no evidence of Latonya's gangrene.    He was admitted and treated with antibiotics, insulin, IV fluids. He did improve with treatment. His insulin was adjusted. His cellulitis markedly improved with IV antibiotic since these were able to be converted to oral.  A thorough discussion was had with the patient to see why he was hyperglycemic. Apparently he previously was on Lantus. However he did not refrigerate the Lantus at home and it  and stopped working. [facility]. However, patient did not know her to use insulin pens. Thus he became hypoglycemic and came to the hospital.  Diabetic education was ordered for him so he could use the pens. However, his wife needed to go  to work and they did not want to wait for the diabetic educator to arrive. The patient promised to [facility] to get diabetic education and to learn how to use the pens. Thus he was able to be discharged home. He was also given oral antibiotics to complete treatment for his cellulitis.   PSA was negative  He was found to have vitamin D deficiency at 18 and started on repletion  The patient met 2-midnight criteria for an inpatient stay at the time of discharge.     Consultants:      Palliative care  Attempted diabetic education    Procedures:        None    Discharge Medications:           Medication List      START taking these medications      Instructions   amoxicillin-clavulanate 875-125 MG Tabs  Commonly known as:  AUGMENTIN   Take 1 Tab by mouth every 12 hours for 4 days.  Dose:  1 Tab     cyclobenzaprine 10 MG Tabs  Commonly known as:  FLEXERIL   Take 1 Tab by mouth 3 times a day as needed for Muscle Spasms (back / neck pain).  Dose:  10 mg     vitamin D (Ergocalciferol) 17623 units Caps capsule  Start taking on:  7/3/2018  Commonly known as:  DRISDOL   Take 1 Cap by mouth every 7 days.  Dose:  79526 Units        CONTINUE taking these medications      Instructions   atorvastatin 10 MG Tabs  Commonly known as:  LIPITOR   Take 10 mg by mouth every evening.  Dose:  10 mg     diazePAM 2 MG Tabs  Commonly known as:  VALIUM   Take 2 mg by mouth 2 times a day as needed for Anxiety.  Dose:  2 mg     HYDROcodone-acetaminophen 7.5-325 MG per tablet  Commonly known as:  NORCO   Take 1 Tab by mouth every 8 hours as needed.  Dose:  1 Tab     insulin 70/30 (70-30) 100 UNIT/ML Susp  Commonly known as:  HUMULIN,NOVOLIN   Inject 40 Units as instructed 2 Times a Day.  Dose:  40 Units     lisinopril 5 MG Tabs  Commonly known as:  PRINIVIL   Take 5 mg by mouth every day.  Dose:  5 mg     metformin 1000 MG tablet  Commonly known as:  GLUCOPHAGE   Take 1,000 mg by mouth 2 times a day, with meals.  Dose:  1000 mg      metOLazone 2.5 MG Tabs  Commonly known as:  ZAROXOLYN   Take 2.5 mg by mouth 1 time daily as needed.  Dose:  2.5 mg     potassium chloride SA 20 MEQ Tbcr  Commonly known as:  Kdur   Take 20 mEq by mouth every day.  Dose:  20 mEq     sertraline 50 MG Tabs  Commonly known as:  ZOLOFT   Take 50 mg by mouth every day.  Dose:  50 mg     traZODone 50 MG Tabs  Commonly known as:  DESYREL   Take 50 mg by mouth at bedtime as needed for Sleep.  Dose:  50 mg     warfarin 7.5 MG Tabs  Commonly known as:  COUMADIN   Take 7.5-10 mg by mouth every day. 7.5 mg MWF and 10 mg all other days, last dose yesterday 06/23/18  Dose:  7.5-10 mg     XALATAN 0.005 % Soln  Generic drug:  latanoprost   Place 1 Drop in both eyes every evening.  Dose:  1 Drop          Disposition:   Discharge home    Diet:   Diabetic    Activity:   As tolerated    Code status:   DNR    Primary Care Provider:    Nico Simmons D.O.    Follow up appointment details :      Nico Simmons D.O.  [address]  [address]  [place] [address]  [phone]    Schedule an appointment as soon as possible for a visit  or [facility] for [facility] CT 4 wks to ensure resolution of 1.8cm RUL lesion    [facility]  [address]  [place] [address]  [phone]    Today  Follow up for instructions on how to use insulin pen    No future appointments.    Pending Studies:        None    Time spent on discharge day patient visit: 47 minutes    #################################################    Interval history/exam for day of discharge:    Vitals:    06/26/18 1635 06/26/18 2000 06/27/18 0400 06/27/18 0842   BP: 143/86 141/94 137/76 154/90   Pulse: 65 87 85 72   Resp: 16 18 17 18   Temp: 36.5 °C (97.7 °F) 36.7 °C (98.1 °F) 36.4 °C (97.6 °F) 36.6 °C (97.8 °F)   SpO2: 94% 94% 93% 94%   Weight:       Height:         Weight/BMI: Body mass index is 28.07 kg/m².  Pulse Oximetry: 94 %, O2 (LPM): 0, O2 Delivery: None (Room Air)    General:  NAD  CVS:   RRR  PULM:  CTAB, no respiratory distress  Ext/: Cellulitis improved    Most Recent Labs:    Lab Results   Component Value Date/Time    WBC 11.6 (H) 06/26/2018 08:32 AM    RBC 5.91 06/26/2018 08:32 AM    HEMOGLOBIN 18.0 06/26/2018 08:32 AM    HEMATOCRIT 54.0 (H) 06/26/2018 08:32 AM    MCV 91.4 06/26/2018 08:32 AM    MCH 30.5 06/26/2018 08:32 AM    MCHC 33.3 (L) 06/26/2018 08:32 AM    MPV 11.2 06/26/2018 08:32 AM    NEUTSPOLYS 69.40 06/26/2018 08:32 AM    LYMPHOCYTES 22.20 06/26/2018 08:32 AM    MONOCYTES 6.60 06/26/2018 08:32 AM    EOSINOPHILS 1.10 06/26/2018 08:32 AM    BASOPHILS 0.40 06/26/2018 08:32 AM      Lab Results   Component Value Date/Time    SODIUM 138 06/26/2018 08:31 AM    POTASSIUM 3.8 06/26/2018 08:31 AM    CHLORIDE 106 06/26/2018 08:31 AM    CO2 23 06/26/2018 08:31 AM    GLUCOSE 182 (H) 06/26/2018 08:31 AM    BUN 21 06/26/2018 08:31 AM    CREATININE 0.76 06/26/2018 08:31 AM      Lab Results   Component Value Date/Time    ALTSGPT 18 06/26/2018 08:31 AM    ASTSGOT 23 06/26/2018 08:31 AM    ALKPHOSPHAT 74 06/26/2018 08:31 AM    TBILIRUBIN 0.6 06/26/2018 08:31 AM    ALBUMIN 3.4 06/26/2018 08:31 AM    GLOBULIN 2.9 06/26/2018 08:31 AM    PREALBUMIN 13.7 (L) 08/24/2012 06:20 AM    INR 2.85 (H) 06/26/2018 08:31 AM     Lab Results   Component Value Date/Time    PROTHROMBTM 29.6 (H) 06/26/2018 08:31 AM    INR 2.85 (H) 06/26/2018 08:31 AM        Imaging/ Testing:      DX-CERVICAL SPINE-2 OR 3 VIEWS   Final Result      No acute compression identified. However C6 and C7 are not demonstrated on the lateral views/obscured by the patient's shoulders. Depending on clinical findings, CT would be a consideration for further evaluation.   Multilevel degenerative changes.      DX-LUMBAR SPINE-2 OR 3 VIEWS   Final Result         Severe degenerative disc disease and facet arthropathy at L5-S1 resulting in mild retrolisthesis of L5 on S1.      CT-CHEST,ABDOMEN,PELVIS W/O   Final Result         1. Patchy bibasilar opacity,  infection, atelectasis or interstitial lung disease.      2. Ill-defined groundglass opacity in the right upper lobe, measuring 1.8 cm. This is nonspecific and could relate to infection, inflammation. Low-grade malignancy cannot be entirely excluded.      3. Sigmoid diverticulosis.      4. Distended esophagus with fluid. This puts patient at risk for aspiration.      5. Evidence of prior granulomatous infection.      DX-CHEST-PORTABLE (1 VIEW)   Final Result      No acute cardiopulmonary disease.          Instructions:      The patient was instructed to return to the ER in the event of worsening symptoms. I have counseled the patient on the importance of compliance and the patient has agreed to proceed with all medical recommendations and follow up plan indicated above.   The patient understands that all medications come with benefits and risks. Risks may include permanent injury or death and these risks can be minimized with close reassessment and monitoring.

## 2018-06-28 NOTE — CONSULTS
Diabetes education: Pt was discharged before seen. Spoke with Neva LLOYD and pt is to [facility]. This CDE will attempt to call him tomorrow to answer any questions.

## 2018-06-29 LAB
BACTERIA BLD CULT: NORMAL
BACTERIA BLD CULT: NORMAL
SIGNIFICANT IND 70042: NORMAL
SIGNIFICANT IND 70042: NORMAL
SITE SITE: NORMAL
SITE SITE: NORMAL
SOURCE SOURCE: NORMAL
SOURCE SOURCE: NORMAL

## 2018-09-25 NOTE — ADDENDUM NOTE
Encounter addended by: Rishabh Fuchs M.D. on: 9/24/2018  7:59 PM<BR>    Actions taken: Sign clinical note

## 2019-08-21 ENCOUNTER — HOSPITAL ENCOUNTER (OUTPATIENT)
Dept: RADIOLOGY | Facility: MEDICAL CENTER | Age: 84
End: 2019-08-21
Attending: NURSE PRACTITIONER
Payer: COMMERCIAL

## 2019-08-21 DIAGNOSIS — R91.8 LUNG MASS: ICD-10-CM

## 2019-08-21 PROCEDURE — A9552 F18 FDG: HCPCS

## 2021-01-14 DIAGNOSIS — Z23 NEED FOR VACCINATION: ICD-10-CM
